# Patient Record
Sex: MALE | Race: OTHER | URBAN - METROPOLITAN AREA
[De-identification: names, ages, dates, MRNs, and addresses within clinical notes are randomized per-mention and may not be internally consistent; named-entity substitution may affect disease eponyms.]

---

## 2020-03-13 ENCOUNTER — EMERGENCY (EMERGENCY)
Facility: HOSPITAL | Age: 20
LOS: 1 days | Discharge: HOME | End: 2020-03-13
Attending: EMERGENCY MEDICINE
Payer: MEDICAID

## 2020-03-13 VITALS
SYSTOLIC BLOOD PRESSURE: 119 MMHG | RESPIRATION RATE: 18 BRPM | TEMPERATURE: 99 F | OXYGEN SATURATION: 96 % | WEIGHT: 190.04 LBS | DIASTOLIC BLOOD PRESSURE: 77 MMHG | HEART RATE: 103 BPM | HEIGHT: 70 IN

## 2020-03-13 PROCEDURE — 99283 EMERGENCY DEPT VISIT LOW MDM: CPT

## 2020-03-13 PROCEDURE — 99282 EMERGENCY DEPT VISIT SF MDM: CPT

## 2020-03-13 NOTE — ED PROVIDER NOTE - OBJECTIVE STATEMENT
20 y/o M patient that works at [employer] as a  with no significant PMHx and no significant PSHx presents to the ED with c/o runny nose, congestion, subjective fever today. Patient denies any back pain, known exposure of COVID-19 exposure to the positive tested patient, travel outside of the country, or any other complains.

## 2020-03-13 NOTE — ED PROVIDER NOTE - NSFOLLOWUPINSTRUCTIONS_ED_ALL_ED_FT
Take Tylenol/Ibuprofen as needed for pains/fevers.  Drink plenty of fluids.  Read and follow Covid discharge instructions/precautions as discussed.  Follow up with your doctor or in the Clinic as discussed within 2 days.  Return to the ER for any concerns.

## 2020-03-13 NOTE — ED PROVIDER NOTE - PATIENT PORTAL LINK FT
You can access the FollowMyHealth Patient Portal offered by [facility] by registering at the following website: [email or web address]. By joining [facility]’s FollowMyHealth portal, you will also be able to view your health information using other applications (apps) compatible with our system.

## 2020-03-13 NOTE — ED PROVIDER NOTE - NSFOLLOWUPCLINICS_GEN_ALL_ED_FT
[facility]  [facility]ty Office  [address] - [address]nd [place] [address]  Phone: [phone]  Fax: [phone]  Follow Up Time:

## 2020-03-13 NOTE — ED PROVIDER NOTE - CLINICAL SUMMARY MEDICAL DECISION MAKING FREE TEXT BOX
Likely URI vs. COVID. Discuss recommendation of self isolation, COVID d/c precautions given to the patient. Will d/c.

## 2022-04-06 ENCOUNTER — INPATIENT (INPATIENT)
Facility: HOSPITAL | Age: 22
LOS: 1 days | Discharge: ANOTHER HOSPITAL | DRG: 310 | End: 2022-04-08
Attending: INTERNAL MEDICINE | Admitting: INTERNAL MEDICINE
Payer: MEDICAID

## 2022-04-06 VITALS
RESPIRATION RATE: 16 BRPM | OXYGEN SATURATION: 99 % | TEMPERATURE: 99 F | DIASTOLIC BLOOD PRESSURE: 72 MMHG | WEIGHT: 200.62 LBS | HEIGHT: 70 IN | SYSTOLIC BLOOD PRESSURE: 108 MMHG | HEART RATE: 99 BPM

## 2022-04-06 DIAGNOSIS — R07.9 CHEST PAIN, UNSPECIFIED: ICD-10-CM

## 2022-04-06 LAB
ALBUMIN SERPL ELPH-MCNC: 4.1 G/DL — SIGNIFICANT CHANGE UP (ref 3.5–5)
ALP SERPL-CCNC: 61 U/L — SIGNIFICANT CHANGE UP (ref 40–120)
ALT FLD-CCNC: 43 U/L DA — SIGNIFICANT CHANGE UP (ref 10–60)
ANION GAP SERPL CALC-SCNC: 6 MMOL/L — SIGNIFICANT CHANGE UP (ref 5–17)
AST SERPL-CCNC: 16 U/L — SIGNIFICANT CHANGE UP (ref 10–40)
BASOPHILS # BLD AUTO: 0.03 K/UL — SIGNIFICANT CHANGE UP (ref 0–0.2)
BASOPHILS NFR BLD AUTO: 0.4 % — SIGNIFICANT CHANGE UP (ref 0–2)
BILIRUB SERPL-MCNC: 0.7 MG/DL — SIGNIFICANT CHANGE UP (ref 0.2–1.2)
BUN SERPL-MCNC: 12 MG/DL — SIGNIFICANT CHANGE UP (ref 7–18)
CALCIUM SERPL-MCNC: 9.1 MG/DL — SIGNIFICANT CHANGE UP (ref 8.4–10.5)
CHLORIDE SERPL-SCNC: 107 MMOL/L — SIGNIFICANT CHANGE UP (ref 96–108)
CK MB BLD-MCNC: <1.4 % — SIGNIFICANT CHANGE UP (ref 0–3.5)
CK MB CFR SERPL CALC: <1 NG/ML — SIGNIFICANT CHANGE UP (ref 0–3.6)
CK SERPL-CCNC: 74 U/L — SIGNIFICANT CHANGE UP (ref 35–232)
CO2 SERPL-SCNC: 26 MMOL/L — SIGNIFICANT CHANGE UP (ref 22–31)
CREAT SERPL-MCNC: 0.85 MG/DL — SIGNIFICANT CHANGE UP (ref 0.5–1.3)
D DIMER BLD IA.RAPID-MCNC: <150 NG/ML DDU — SIGNIFICANT CHANGE UP
EGFR: 125 ML/MIN/1.73M2 — SIGNIFICANT CHANGE UP
EOSINOPHIL # BLD AUTO: 0.04 K/UL — SIGNIFICANT CHANGE UP (ref 0–0.5)
EOSINOPHIL NFR BLD AUTO: 0.5 % — SIGNIFICANT CHANGE UP (ref 0–6)
GLUCOSE SERPL-MCNC: 106 MG/DL — HIGH (ref 70–99)
HCT VFR BLD CALC: 40.5 % — SIGNIFICANT CHANGE UP (ref 39–50)
HGB BLD-MCNC: 13.8 G/DL — SIGNIFICANT CHANGE UP (ref 13–17)
IMM GRANULOCYTES NFR BLD AUTO: 0.1 % — SIGNIFICANT CHANGE UP (ref 0–1.5)
LYMPHOCYTES # BLD AUTO: 1.15 K/UL — SIGNIFICANT CHANGE UP (ref 1–3.3)
LYMPHOCYTES # BLD AUTO: 14.6 % — SIGNIFICANT CHANGE UP (ref 13–44)
MCHC RBC-ENTMCNC: 30.1 PG — SIGNIFICANT CHANGE UP (ref 27–34)
MCHC RBC-ENTMCNC: 34.1 GM/DL — SIGNIFICANT CHANGE UP (ref 32–36)
MCV RBC AUTO: 88.2 FL — SIGNIFICANT CHANGE UP (ref 80–100)
MONOCYTES # BLD AUTO: 0.55 K/UL — SIGNIFICANT CHANGE UP (ref 0–0.9)
MONOCYTES NFR BLD AUTO: 7 % — SIGNIFICANT CHANGE UP (ref 2–14)
NEUTROPHILS # BLD AUTO: 6.07 K/UL — SIGNIFICANT CHANGE UP (ref 1.8–7.4)
NEUTROPHILS NFR BLD AUTO: 77.4 % — HIGH (ref 43–77)
NRBC # BLD: 0 /100 WBCS — SIGNIFICANT CHANGE UP (ref 0–0)
PCP SPEC-MCNC: SIGNIFICANT CHANGE UP
PLATELET # BLD AUTO: 205 K/UL — SIGNIFICANT CHANGE UP (ref 150–400)
POTASSIUM SERPL-MCNC: 3.9 MMOL/L — SIGNIFICANT CHANGE UP (ref 3.5–5.3)
POTASSIUM SERPL-SCNC: 3.9 MMOL/L — SIGNIFICANT CHANGE UP (ref 3.5–5.3)
PROT SERPL-MCNC: 8.3 G/DL — SIGNIFICANT CHANGE UP (ref 6–8.3)
RBC # BLD: 4.59 M/UL — SIGNIFICANT CHANGE UP (ref 4.2–5.8)
RBC # FLD: 13 % — SIGNIFICANT CHANGE UP (ref 10.3–14.5)
SARS-COV-2 RNA SPEC QL NAA+PROBE: SIGNIFICANT CHANGE UP
SODIUM SERPL-SCNC: 139 MMOL/L — SIGNIFICANT CHANGE UP (ref 135–145)
TROPONIN I, HIGH SENSITIVITY RESULT: 5.7 NG/L — SIGNIFICANT CHANGE UP
TROPONIN I, HIGH SENSITIVITY RESULT: 6.6 NG/L — SIGNIFICANT CHANGE UP
WBC # BLD: 7.85 K/UL — SIGNIFICANT CHANGE UP (ref 3.8–10.5)
WBC # FLD AUTO: 7.85 K/UL — SIGNIFICANT CHANGE UP (ref 3.8–10.5)

## 2022-04-06 PROCEDURE — 99285 EMERGENCY DEPT VISIT HI MDM: CPT

## 2022-04-06 PROCEDURE — 71045 X-RAY EXAM CHEST 1 VIEW: CPT | Mod: 26

## 2022-04-06 NOTE — H&P ADULT - PROBLEM SELECTOR PLAN 1
- p/w palpitation  - likely panic attack vs ACS vs WPW, LGL  - trop 5.7 > 6.6  - utox neg  - EKG normal rhythm w/ shorten MD  - c/w ASA, statin, ativan PRN  - telemonitoring  - trend trop  - f/u echo

## 2022-04-06 NOTE — ED PROVIDER NOTE - PROGRESS NOTE DETAILS
Incoming call from patient's wife stating she spoke to someone to fax records. I dont show any phone calls about needing records faxed. Communicated to Luz Marina that if they sign a release, we can send to release of information and records can be sent to where it needs to go. Luz Marina is going to stop by the clinic today to sign a release. She states she has power of  of patient.    Patient is resting comfortably, NAD. I spoke to Dr. Taylor, who reviewed EKGs. He recommended admission to tele. EKG #2 from 19:33 - SR at 76 with short AZ, unchanged from prior EKG. Patient currently asymptomatic. endorsed to Dr. Ambriz.

## 2022-04-06 NOTE — ED ADULT NURSE NOTE - OBJECTIVE STATEMENT
Patient AOx3, calm, cooperative, in no acute distress, reports left side burning chest pain 4/10 and palpitations that started today prior to arrival. No SOB or difficulty breathing.

## 2022-04-06 NOTE — H&P ADULT - PROBLEM SELECTOR PLAN 2
- noted to have shorten AR  - ddx: WPW vs  - telemonitoring    - cardio, Dr. Orantes, consulted - noted to have shorten ME  - ddx: WPW vs  - telemonitoring    - cardio, Dr. Taylor, onboard

## 2022-04-06 NOTE — ED PROVIDER NOTE - OBJECTIVE STATEMENT
Patient reports at 4pm, he had sudden onset of heart racing, 8/10 burning chest pain, dyspnea, nausea, diaphoresis. Lasted 1 hour. All sx gone now except chest pain, which has improved to 4/10. Not exertional. No fever, cough, ap, v/d. Patient reports he had similar sx in October 2021 after he took meth. Says he has not used drugs or drank alcohol since then.

## 2022-04-06 NOTE — H&P ADULT - ATTENDING COMMENTS
21y Male complaining of palpitations patient reports at 4pm, he had sudden onset of heart racing, 8/10 burning chest pain, dyspnea, nausea, diaphoresis. Lasted 1 hour. All sx gone now except chest pain, which has improved to 4/10. Not exertional. No fever, cough, ap, v/d. Patient reports he had similar sx in October 2021 after he took meth. Says he has not used drugs or drank alcohol since then.        assessment   --- chest pain,  r/o acs, palpitation r/o wpw, r/o afifb, h/o illicit drug use    plan  --  adm to tele, acs protocol, hold lopressor pending drug screen, aspirin, statin, cont preadmit home meds, gi and dvt prophylaxis  drug screen stat, cbc, bmp, mg, phos, lipid, tsh, ce q8 x3    echo    cardio cons 21yoM, h/o methamphetamine use disorder (last use Oct2021), presents with palpitation. He reports sudden chest palpitation around at 4:30pm, [place] He reports associated burning, left-sided chest pain that is 8-9/10, intermittent, and non-radiating. It was also associated with nausea SOB and HA. He states feeling of panic, crowdedness, anxiousness, heat flush, and perspiration. He abruptly stopped taking seroquel 2 wks ago due to unable to follow up with psychiatrist. He denies fever, chills, vomiting, abdominal pain, urinary or bowel movement changes. All sx gone now except chest pain, which has improved to 4/10. Not exertional. No fever, cough, ap, v/d. Patient reports he had similar sx in October 2021 after he took meth. Says he has not used drugs or drank alcohol since then.        assessment   --- chest pain,  r/o acs, palpitation r/o wpw, r/o afib, panic attack, h/o methamphetamine use disorder (last use Oct2021)    plan  --  adm to tele, acs protocol, hold lopressor pending drug screen, aspirin, statin, cont preadmit home meds, gi and dvt prophylaxis  drug screen stat, cbc, bmp, mg, phos, lipid, tsh, ce q8 x3    echo    cardio cons

## 2022-04-06 NOTE — H&P ADULT - ASSESSMENT
Patient is a 21yoM, h/o methamphetamine use disorder (last use Oct2021), presents with palpitation. Admitted for abnormal EKG

## 2022-04-06 NOTE — ED PROVIDER NOTE - CLINICAL SUMMARY MEDICAL DECISION MAKING FREE TEXT BOX
Patient with 1 hour of heart racing, chest pain, shortness of breath, nausea, diaphoresis. EKG with short FL. concern for WPW. WIll get labs and consult cardiology.

## 2022-04-06 NOTE — H&P ADULT - HISTORY OF PRESENT ILLNESS
Patient is a 21yoM, h/o methamphetamine use disorder (last use Oct2021), presents with palpitation. He reports sudden chest palpitation around at 4:30pm, [place]. He reports associated burning, left-sided chest pain that is 8-9/10, intermittent, and non-radiating. It was also associated with nausea SOB and HA. He states feeling of panic, crowdedness, anxiousness, heat flush, and perspiration. He abruptly stopped taking seroquel 2 wks ago due to unable to follow up with psychiatrist. He denies fever, chills, vomiting, abdominal pain, urinary or bowel movement changes.

## 2022-04-07 DIAGNOSIS — R94.31 ABNORMAL ELECTROCARDIOGRAM [ECG] [EKG]: ICD-10-CM

## 2022-04-07 DIAGNOSIS — R06.02 SHORTNESS OF BREATH: ICD-10-CM

## 2022-04-07 DIAGNOSIS — R00.2 PALPITATIONS: ICD-10-CM

## 2022-04-07 DIAGNOSIS — Z29.9 ENCOUNTER FOR PROPHYLACTIC MEASURES, UNSPECIFIED: ICD-10-CM

## 2022-04-07 DIAGNOSIS — F41.9 ANXIETY DISORDER, UNSPECIFIED: ICD-10-CM

## 2022-04-07 PROBLEM — Z78.9 OTHER SPECIFIED HEALTH STATUS: Chronic | Status: ACTIVE | Noted: 2020-03-16

## 2022-04-07 LAB
ALBUMIN SERPL ELPH-MCNC: 4.2 G/DL — SIGNIFICANT CHANGE UP (ref 3.5–5)
ALP SERPL-CCNC: 59 U/L — SIGNIFICANT CHANGE UP (ref 40–120)
ALT FLD-CCNC: 42 U/L DA — SIGNIFICANT CHANGE UP (ref 10–60)
ANION GAP SERPL CALC-SCNC: 9 MMOL/L — SIGNIFICANT CHANGE UP (ref 5–17)
AST SERPL-CCNC: 14 U/L — SIGNIFICANT CHANGE UP (ref 10–40)
BASOPHILS # BLD AUTO: 0.03 K/UL — SIGNIFICANT CHANGE UP (ref 0–0.2)
BASOPHILS NFR BLD AUTO: 0.4 % — SIGNIFICANT CHANGE UP (ref 0–2)
BILIRUB SERPL-MCNC: 0.8 MG/DL — SIGNIFICANT CHANGE UP (ref 0.2–1.2)
BUN SERPL-MCNC: 12 MG/DL — SIGNIFICANT CHANGE UP (ref 7–18)
CALCIUM SERPL-MCNC: 9.1 MG/DL — SIGNIFICANT CHANGE UP (ref 8.4–10.5)
CHLORIDE SERPL-SCNC: 107 MMOL/L — SIGNIFICANT CHANGE UP (ref 96–108)
CK MB BLD-MCNC: <1.4 % — SIGNIFICANT CHANGE UP (ref 0–3.5)
CK MB CFR SERPL CALC: <1 NG/ML — SIGNIFICANT CHANGE UP (ref 0–3.6)
CK SERPL-CCNC: 73 U/L — SIGNIFICANT CHANGE UP (ref 35–232)
CO2 SERPL-SCNC: 24 MMOL/L — SIGNIFICANT CHANGE UP (ref 22–31)
CREAT SERPL-MCNC: 0.76 MG/DL — SIGNIFICANT CHANGE UP (ref 0.5–1.3)
EGFR: 130 ML/MIN/1.73M2 — SIGNIFICANT CHANGE UP
EOSINOPHIL # BLD AUTO: 0.09 K/UL — SIGNIFICANT CHANGE UP (ref 0–0.5)
EOSINOPHIL NFR BLD AUTO: 1.3 % — SIGNIFICANT CHANGE UP (ref 0–6)
GLUCOSE SERPL-MCNC: 89 MG/DL — SIGNIFICANT CHANGE UP (ref 70–99)
HCT VFR BLD CALC: 40.9 % — SIGNIFICANT CHANGE UP (ref 39–50)
HGB BLD-MCNC: 13.9 G/DL — SIGNIFICANT CHANGE UP (ref 13–17)
IMM GRANULOCYTES NFR BLD AUTO: 0.1 % — SIGNIFICANT CHANGE UP (ref 0–1.5)
LYMPHOCYTES # BLD AUTO: 2.89 K/UL — SIGNIFICANT CHANGE UP (ref 1–3.3)
LYMPHOCYTES # BLD AUTO: 43.2 % — SIGNIFICANT CHANGE UP (ref 13–44)
MAGNESIUM SERPL-MCNC: 2 MG/DL — SIGNIFICANT CHANGE UP (ref 1.6–2.6)
MCHC RBC-ENTMCNC: 30 PG — SIGNIFICANT CHANGE UP (ref 27–34)
MCHC RBC-ENTMCNC: 34 GM/DL — SIGNIFICANT CHANGE UP (ref 32–36)
MCV RBC AUTO: 88.3 FL — SIGNIFICANT CHANGE UP (ref 80–100)
MONOCYTES # BLD AUTO: 0.56 K/UL — SIGNIFICANT CHANGE UP (ref 0–0.9)
MONOCYTES NFR BLD AUTO: 8.4 % — SIGNIFICANT CHANGE UP (ref 2–14)
NEUTROPHILS # BLD AUTO: 3.11 K/UL — SIGNIFICANT CHANGE UP (ref 1.8–7.4)
NEUTROPHILS NFR BLD AUTO: 46.6 % — SIGNIFICANT CHANGE UP (ref 43–77)
NRBC # BLD: 0 /100 WBCS — SIGNIFICANT CHANGE UP (ref 0–0)
PHOSPHATE SERPL-MCNC: 4.6 MG/DL — HIGH (ref 2.5–4.5)
PLATELET # BLD AUTO: 198 K/UL — SIGNIFICANT CHANGE UP (ref 150–400)
POTASSIUM SERPL-MCNC: 3.6 MMOL/L — SIGNIFICANT CHANGE UP (ref 3.5–5.3)
POTASSIUM SERPL-SCNC: 3.6 MMOL/L — SIGNIFICANT CHANGE UP (ref 3.5–5.3)
PROT SERPL-MCNC: 7.8 G/DL — SIGNIFICANT CHANGE UP (ref 6–8.3)
RBC # BLD: 4.63 M/UL — SIGNIFICANT CHANGE UP (ref 4.2–5.8)
RBC # FLD: 13.2 % — SIGNIFICANT CHANGE UP (ref 10.3–14.5)
SODIUM SERPL-SCNC: 140 MMOL/L — SIGNIFICANT CHANGE UP (ref 135–145)
TROPONIN I, HIGH SENSITIVITY RESULT: 3.6 NG/L — SIGNIFICANT CHANGE UP
TSH SERPL-MCNC: 2.3 UU/ML — SIGNIFICANT CHANGE UP (ref 0.34–4.82)
WBC # BLD: 6.69 K/UL — SIGNIFICANT CHANGE UP (ref 3.8–10.5)
WBC # FLD AUTO: 6.69 K/UL — SIGNIFICANT CHANGE UP (ref 3.8–10.5)

## 2022-04-07 PROCEDURE — 93306 TTE W/DOPPLER COMPLETE: CPT | Mod: 26

## 2022-04-07 PROCEDURE — 93010 ELECTROCARDIOGRAM REPORT: CPT

## 2022-04-07 RX ORDER — POTASSIUM CHLORIDE 20 MEQ
40 PACKET (EA) ORAL ONCE
Refills: 0 | Status: COMPLETED | OUTPATIENT
Start: 2022-04-07 | End: 2022-04-07

## 2022-04-07 RX ORDER — ENOXAPARIN SODIUM 100 MG/ML
40 INJECTION SUBCUTANEOUS EVERY 24 HOURS
Refills: 0 | Status: DISCONTINUED | OUTPATIENT
Start: 2022-04-07 | End: 2022-04-07

## 2022-04-07 RX ORDER — ASPIRIN/CALCIUM CARB/MAGNESIUM 324 MG
81 TABLET ORAL DAILY
Refills: 0 | Status: DISCONTINUED | OUTPATIENT
Start: 2022-04-07 | End: 2022-04-07

## 2022-04-07 RX ORDER — ATORVASTATIN CALCIUM 80 MG/1
40 TABLET, FILM COATED ORAL AT BEDTIME
Refills: 0 | Status: DISCONTINUED | OUTPATIENT
Start: 2022-04-07 | End: 2022-04-07

## 2022-04-07 RX ORDER — ACETAMINOPHEN 500 MG
650 TABLET ORAL ONCE
Refills: 0 | Status: COMPLETED | OUTPATIENT
Start: 2022-04-07 | End: 2022-04-07

## 2022-04-07 RX ADMIN — Medication 650 MILLIGRAM(S): at 02:44

## 2022-04-07 RX ADMIN — Medication 81 MILLIGRAM(S): at 00:37

## 2022-04-07 RX ADMIN — Medication 650 MILLIGRAM(S): at 01:54

## 2022-04-07 RX ADMIN — Medication 81 MILLIGRAM(S): at 11:45

## 2022-04-07 RX ADMIN — Medication 40 MILLIEQUIVALENT(S): at 11:45

## 2022-04-07 RX ADMIN — ATORVASTATIN CALCIUM 40 MILLIGRAM(S): 80 TABLET, FILM COATED ORAL at 00:38

## 2022-04-07 NOTE — DISCHARGE NOTE PROVIDER - HOSPITAL COURSE
21yoM, h/o methamphetamine use disorder (last use Oct2021), presents with palpitations. He reports sudden chest palpitation around at 4:30pm, [place]. He reports associated burning, left-sided chest pain that is 8-9/10, intermittent, and non-radiating. It was also associated with nausea SOB and HA. He states feeling of panic, crowdedness, anxiousness, heat flush, and perspiration. He abruptly stopped taking seroquel 2 wks ago due to unable to follow up with psychiatrist. He denies fever, chills, vomiting, abdominal pain, urinary or bowel movement changes. No family history of arrythmia or sudden cardiac death. Pt was admitted to telemetry unit for further workup. Troponin was negativex3, Utox negative, EKG showed normal rhythm w/ shorten IN, incomplete RBBB. Pt was started on aspirin statin and ativan PRN. Cardio Claudia and ALDAIR Gregory consulted  ALDAIR Gregory. Pt had episodes of SVT to 140s on tele. Stress-echo was normal. Patient to be transferred to [facility] for EP study under ALDAIR Gregory.

## 2022-04-07 NOTE — CONSULT NOTE ADULT - SUBJECTIVE AND OBJECTIVE BOX
C A R D I O L O G Y  *********************    DATE OF SERVICE: 04-07-22    HISTORY OF PRESENT ILLNESS: HPI:  Patient is a 21yoM, h/o methamphetamine use disorder (last use Oct2021), presents with palpitation. He reports sudden chest palpitation around at 4:30pm, [place] He reports associated burning, left-sided chest pain that is 8-9/10, intermittent, and non-radiating. It was also associated with nausea SOB and HA. He states feeling of panic, crowdedness, anxiousness, heat flush, and perspiration. He abruptly stopped taking seroquel 2 wks ago due to unable to follow up with psychiatrist. He denies fever, chills, vomiting, abdominal pain, urinary or bowel movement changes. (06 Apr 2022 21:45)      PAST MEDICAL & SURGICAL HISTORY:  No pertinent past medical history            MEDICATIONS:  MEDICATIONS  (STANDING):  aspirin  chewable 81 milliGRAM(s) Oral daily  atorvastatin 40 milliGRAM(s) Oral at bedtime  enoxaparin Injectable 40 milliGRAM(s) SubCutaneous every 24 hours  potassium chloride    Tablet ER 40 milliEquivalent(s) Oral once      Allergies    No Known Allergies    Intolerances        FAMILY HISTORY:    Non-contributary for premature coronary disease or sudden cardiac death    SOCIAL HISTORY:    [ ] Non-smoker  [ ] Smoker  [ ] Alcohol    FLU VACCINE THIS YEAR STARTS IN AUGUST:  [ ] Yes    [ ] No    IF OVER 65 HAVE YOU EVER HAD A PNA VACCINE:  [ ] Yes    [ ] No       [ ] N/A      REVIEW OF SYSTEMS:  [ ]chest pain  [  ]shortness of breath  [  ]palpitations  [  ]syncope  [ ]near syncope [ ]upper extremity weakness   [ ] lower extremity weakness  [  ]diplopia  [  ]altered mental status   [  ]fevers  [ ]chills [ ]nausea  [ ]vomitting  [  ]dysphagia    [ ]abdominal pain  [ ]melena  [ ]BRBPR    [  ]epistaxis  [  ]rash    [ ]lower extremity edema        [X] All others negative	  [ ] Unable to obtain      LABS:	 	    CARDIAC MARKERS:  CARDIAC MARKERS ( 07 Apr 2022 00:44 )  x     / x     / 73 U/L / x     / <1.0 ng/mL  CARDIAC MARKERS ( 06 Apr 2022 21:55 )  x     / x     / 74 U/L / x     / <1.0 ng/mL        Troponin I, High Sensitivity Result: 3.6 ng/L (04-07-22 @ 00:44)  Troponin I, High Sensitivity Result: 6.6 ng/L (04-06-22 @ 21:55)  Troponin I, High Sensitivity Result: 5.7 ng/L (04-06-22 @ 18:50)                            13.9   6.69  )-----------( 198      ( 07 Apr 2022 06:54 )             40.9     Hb Trend: 13.9<--, 13.8<--    04-07    140  |  107  |  12  ----------------------------<  89  3.6   |  24  |  0.76    Ca    9.1      07 Apr 2022 06:54  Phos  4.6     04-07  Mg     2.0     04-07    TPro  7.8  /  Alb  4.2  /  TBili  0.8  /  DBili  x   /  AST  14  /  ALT  42  /  AlkPhos  59  04-07    Creatinine Trend: 0.76<--, 0.85<--    Coags:      proBNP:   Lipid Profile:   HgA1c:   TSH: Thyroid Stimulating Hormone, Serum: 2.30 uU/mL (04-07 @ 06:54)          PHYSICAL EXAM:  T(C): 36.6 (04-07-22 @ 07:45), Max: 37.2 (04-06-22 @ 18:10)  HR: 73 (04-07-22 @ 07:45) (70 - 99)  BP: 104/70 (04-07-22 @ 07:45) (104/70 - 115/72)  RR: 17 (04-07-22 @ 07:45) (16 - 18)  SpO2: 95% (04-07-22 @ 07:45) (95% - 100%)  Wt(kg): --   BMI (kg/m2): 28.8 (04-06-22 @ 18:10)  I&O's Summary      Gen: Appears well in NAD  HEENT:  (-)icterus (-)pallor  CV: N S1 S2 1/6 KENNEY (+)2 Pulses B/l  Resp:  Clear to ausculatation B/L, normal effort  GI: (+) BS Soft, NT, ND  Lymph:  (-)Edema, (-)obvious lymphadenopathy  Skin: Warm to touch, Normal turgor  Psych: Appropriate mood and affect        TELEMETRY: 	      ECG:  	    RADIOLOGY:         CXR:     ASSESSMENT/PLAN: 	21y Male     C A R D I O L O G Y  *********************    DATE OF SERVICE: 04-07-22    HISTORY OF PRESENT ILLNESS: HPI:  Patient is a 21yoM, h/o methamphetamine use disorder (last use Oct2021), presents with palpitation. He reports sudden chest palpitation around at 4:30pm, [place]. He reports associated burning, left-sided chest pain that is 8-9/10, intermittent, and non-radiating. It was also associated with nausea SOB and HA. He felt as if he was going to pass out but did not loose consciousness He states feeling of panic, crowdedness, anxiousness, heat flush, and perspiration. He abruptly stopped taking seroquel 2 wks ago due to unable to follow up with psychiatrist. He denies fever, chills, vomiting, abdominal pain, urinary or bowel movement changes. (06 Apr 2022 21:45)      PAST MEDICAL & SURGICAL HISTORY:  No pertinent past medical history            MEDICATIONS:  MEDICATIONS  (STANDING):  aspirin  chewable 81 milliGRAM(s) Oral daily  atorvastatin 40 milliGRAM(s) Oral at bedtime  enoxaparin Injectable 40 milliGRAM(s) SubCutaneous every 24 hours  potassium chloride    Tablet ER 40 milliEquivalent(s) Oral once      Allergies    No Known Allergies    Intolerances        FAMILY HISTORY:    Non-contributary for premature coronary disease or sudden cardiac death    SOCIAL HISTORY:    [X ] Non-smoker  [ ] Smoker  [ ] Alcohol    FLU VACCINE THIS YEAR STARTS IN AUGUST:  [ ] Yes    [ ] No    IF OVER 65 HAVE YOU EVER HAD A PNA VACCINE:  [ ] Yes    [ ] No       [ ] N/A      REVIEW OF SYSTEMS:  [ ]chest pain  [  ]shortness of breath  [  ]palpitations  [  ]syncope  [ ]near syncope [ ]upper extremity weakness   [ ] lower extremity weakness  [  ]diplopia  [  ]altered mental status   [  ]fevers  [ ]chills [ ]nausea  [ ]vomitting  [  ]dysphagia    [ ]abdominal pain  [ ]melena  [ ]BRBPR    [  ]epistaxis  [  ]rash    [ ]lower extremity edema        [X] All others negative	  [ ] Unable to obtain      LABS:	 	    CARDIAC MARKERS:  CARDIAC MARKERS ( 07 Apr 2022 00:44 )  x     / x     / 73 U/L / x     / <1.0 ng/mL  CARDIAC MARKERS ( 06 Apr 2022 21:55 )  x     / x     / 74 U/L / x     / <1.0 ng/mL        Troponin I, High Sensitivity Result: 3.6 ng/L (04-07-22 @ 00:44)  Troponin I, High Sensitivity Result: 6.6 ng/L (04-06-22 @ 21:55)  Troponin I, High Sensitivity Result: 5.7 ng/L (04-06-22 @ 18:50)                            13.9   6.69  )-----------( 198      ( 07 Apr 2022 06:54 )             40.9     Hb Trend: 13.9<--, 13.8<--    04-07    140  |  107  |  12  ----------------------------<  89  3.6   |  24  |  0.76    Ca    9.1      07 Apr 2022 06:54  Phos  4.6     04-07  Mg     2.0     04-07    TPro  7.8  /  Alb  4.2  /  TBili  0.8  /  DBili  x   /  AST  14  /  ALT  42  /  AlkPhos  59  04-07    Creatinine Trend: 0.76<--, 0.85<--    Coags:      proBNP:   Lipid Profile:   HgA1c:   TSH: Thyroid Stimulating Hormone, Serum: 2.30 uU/mL (04-07 @ 06:54)          PHYSICAL EXAM:  T(C): 36.6 (04-07-22 @ 07:45), Max: 37.2 (04-06-22 @ 18:10)  HR: 73 (04-07-22 @ 07:45) (70 - 99)  BP: 104/70 (04-07-22 @ 07:45) (104/70 - 115/72)  RR: 17 (04-07-22 @ 07:45) (16 - 18)  SpO2: 95% (04-07-22 @ 07:45) (95% - 100%)  Wt(kg): --   BMI (kg/m2): 28.8 (04-06-22 @ 18:10)  I&O's Summary    HEENT:  (-)icterus (-)pallor  CV: N S1 S2 1/6 KENNEY (+)2 Pulses B/l  Resp:  Clear to ausculatation B/L, normal effort  GI: (+) BS Soft, NT, ND  Lymph:  (-)Edema, (-)obvious lymphadenopathy  Skin: Warm to touch, Normal turgor  Psych: Appropriate mood and affect        TELEMETRY: 	  Sinus, Sinus tach    ECG:  	Sinus Short MN, early R wave transition     RADIOLOGY:         CXR:   < from: Xray Chest 1 View- PORTABLE-Urgent (04.06.22 @ 18:49) >  Clear lungs.    < end of copied text >      ASSESSMENT/PLAN: 	21y Male admitted with palpitations, chest tightness, near syncope and an abnormal EKG concerning for pre-excitation.    - Echo  - Plan for stress echo today  - EP eval Dr. Gregory called   - TSH wnl    I once again thank you for allowing me to participate in the care of your patient.  If you have any questions or concerns please do not hesitate to contact me.    Beni Taylor MD, FACC  BEEPER [phone]

## 2022-04-07 NOTE — CONSULT NOTE ADULT - SUBJECTIVE AND OBJECTIVE BOX
EP Attending    HISTORY OF PRESENT ILLNESS: HPI:  Patient is a 21yoM, h/o methamphetamine use disorder (last use Oct2021), presents with palpitation. He reports sudden chest palpitation around at 4:30pm, while [place]. He reports associated burning, left-sided chest pain that is 8-9/10, intermittent, and non-radiating. It was also associated with nausea SOB and HA. He states feeling of panic, crowdedness, anxiousness, heat flush, and perspiration. He abruptly stopped taking seroquel 2 wks ago due to unable to follow up with psychiatrist. He denies fever, chills, vomiting, abdominal pain, urinary or bowel movement changes. (06 Apr 2022 21:45)    Patient states longstanding history of palpitations that are infrequent, but this episode was the most severe. Happened after a sudden burst of exertion [place] catch the subway.  Had intense chest pain, very rapid palpitations, felt nauseated and hungry for air.  He states he is sober from all alcohol and drugs.  Currently not working, living with family.  No history of fainting, or cardiac-arrest during exertion. A 10 pt ROS is otherwise negative.    PAST MEDICAL & SURGICAL HISTORY:  No pertinent past medical history    MEDICATIONS  (STANDING):  aspirin  chewable 81 milliGRAM(s) Oral daily  atorvastatin 40 milliGRAM(s) Oral at bedtime      Allergies    No Known Allergies    Intolerances    FAMILY HISTORY:    Non-contributary for premature coronary disease or sudden cardiac death    SOCIAL HISTORY:    [x ] Non-smoker  [ ] Smoker  [ ] Alcohol    PHYSICAL EXAM:  T(C): 36.5 (04-07-22 @ 11:02), Max: 37.2 (04-06-22 @ 18:10)  HR: 89 (04-07-22 @ 11:02) (70 - 99)  BP: 108/74 (04-07-22 @ 11:02) (104/70 - 115/72)  RR: 19 (04-07-22 @ 11:02) (16 - 19)  SpO2: 98% (04-07-22 @ 11:02) (95% - 100%)  Wt(kg): --    Interview conducted via video-conference.  Appearance: Normal appearing adult male in no acute distress  Psychiatry:  Mood & affect appropriate    TELEMETRY: NSR	    ECG: NSR with large R-waves in V1,V2, short TN, pre-excitation noted in leads III and V1.  May be consistent with a left-sided accessory pathway 	  Echo: Stress echo with normal biventricular function,after uneventul 10 METS exercise on treadmill.	  	  LABS:	 	                          13.9   6.69  )-----------( 198      ( 07 Apr 2022 06:54 )             40.9     04-07    140  |  107  |  12  ----------------------------<  89  3.6   |  24  |  0.76    Ca    9.1      07 Apr 2022 06:54  Phos  4.6     04-07  Mg     2.0     04-07    TPro  7.8  /  Alb  4.2  /  TBili  0.8  /  DBili  x   /  AST  14  /  ALT  42  /  AlkPhos  59  04-07  TSH: Thyroid Stimulating Hormone, Serum: 2.30 uU/mL (04-07 @ 06:54)      ASSESSMENT/PLAN: 	Mr Ford is a very pleasant 22yo male who presents with severe symptomatic palpitations.    His symptoms are infrequent but severe.  By the time he presented to the ED, was in sinus rhythm.  His stress-echo was normal.  He is abstinent from drugs and alcohol.  Based on the abnormal baseline EKG and his strong desire to avoid an ED presentation again, the patient was given options for pill-in-the-pocket beta blocker therapy, vs EP study and ablation for SVT.  I described a 30-40% rate of effectiveness for oral metoprolol to abort SVT, that may require some drug titration in the outpatient setting.  Ablation could be organized from the office setting in a few weeks after an appointment.  The side effect profile for PRN metoprolol is quite minimal, and is not expected to cause exercise intolerance or decrease in libido when used in this manner.  His backup plan would be to come to the nearest ED if palpitations are sustained.  An EP study would likely involve trans-septal puncture, and carries a 1% risk of vascular injury, or cardiac perforation which could require procedures to repair structures or drain blood collections.  The risk of MI, stroke, open-heart surgery or death is very low.  If he has a left-sided accessory pathway, ablation is not expected to cause the need for a cardiac pacemaker.  If we ablate a septal structure instead, that risk is ~3%.  I explained the plan of care in depth to the patient, and again using a [place]  for his mother's understanding while she was at bedside.  At this time his preference is to stay in-hospital to undergo EP study at [facility].    He is scheduled for mid-day (2nd case) tomorrow.   I've made him NPO at midnight and started the transfer process, and he will be accepted under my name at [facility].  No beta blockers overnight.  Does not need Lovenox tomorrow.  Will see him in-person before the procedure.  Anticipate he will be discharged next-day from [facility].      Ernesto Gregory M.D.  Cardiac Electrophysiology    office [phone]  pager [phone]

## 2022-04-07 NOTE — PROGRESS NOTE ADULT - SUBJECTIVE AND OBJECTIVE BOX
Patient is a 21y old  Male who presents with a chief complaint of CP (06 Apr 2022 21:45)    pt seen in icu [  ], reg med floor [   ], bed [  ], chair at bedside [   ], a+o x3 [  ], lethargic [  ],  nad [  ]    florez [  ], ngt [  ], peg [  ], et tube [  ], cent line [  ], picc line [  ]        Allergies    No Known Allergies        Vitals    T(F): 97.8 (04-07-22 @ 07:45), Max: 98.9 (04-06-22 @ 18:10)  HR: 73 (04-07-22 @ 07:45) (70 - 99)  BP: 104/70 (04-07-22 @ 07:45) (104/70 - 115/72)  RR: 17 (04-07-22 @ 07:45) (16 - 18)  SpO2: 95% (04-07-22 @ 07:45) (95% - 100%)  Wt(kg): --  CAPILLARY BLOOD GLUCOSE          Labs                          13.9   6.69  )-----------( 198      ( 07 Apr 2022 06:54 )             40.9       04-07    140  |  107  |  12  ----------------------------<  89  3.6   |  24  |  0.76    Ca    9.1      07 Apr 2022 06:54  Phos  4.6     04-07  Mg     2.0     04-07    TPro  7.8  /  Alb  4.2  /  TBili  0.8  /  DBili  x   /  AST  14  /  ALT  42  /  AlkPhos  59  04-07      CARDIAC MARKERS ( 07 Apr 2022 00:44 )  x     / x     / 73 U/L / x     / <1.0 ng/mL  CARDIAC MARKERS ( 06 Apr 2022 21:55 )  x     / x     / 74 U/L / x     / <1.0 ng/mL      Troponin I, High Sensitivity Result: 3.6 ng/L (04-07-22 @ 00:44)  Troponin I, High Sensitivity Result: 6.6 ng/L (04-06-22 @ 21:55)  Troponin I, High Sensitivity Result: 5.7 ng/L (04-06-22 @ 18:50)        Radiology Results      Meds    MEDICATIONS  (STANDING):  aspirin  chewable 81 milliGRAM(s) Oral daily  atorvastatin 40 milliGRAM(s) Oral at bedtime  enoxaparin Injectable 40 milliGRAM(s) SubCutaneous every 24 hours  potassium chloride    Tablet ER 40 milliEquivalent(s) Oral once      MEDICATIONS  (PRN):  LORazepam   Injectable 0.5 milliGRAM(s) IV Push daily PRN Anxiety      Physical Exam    Neuro :  no focal deficits  Respiratory: CTA B/L  CV: RRR, S1S2, no murmurs,   Abdominal: Soft, NT, ND +BS,  Extremities: No edema, + peripheral pulses    ASSESSMENT    Chest pain    No pertinent past medical history        PLAN     Patient is a 21y old  Male who presents with a chief complaint of CP (06 Apr 2022 21:45)    pt seen in tele [x  ], reg med floor [   ], bed [ x ], chair at bedside [   ], a+o x3 [ x ], lethargic [  ],  nad [ x ]    Allergies    No Known Allergies        Vitals    T(F): 97.8 (04-07-22 @ 07:45), Max: 98.9 (04-06-22 @ 18:10)  HR: 73 (04-07-22 @ 07:45) (70 - 99)  BP: 104/70 (04-07-22 @ 07:45) (104/70 - 115/72)  RR: 17 (04-07-22 @ 07:45) (16 - 18)  SpO2: 95% (04-07-22 @ 07:45) (95% - 100%)  Wt(kg): --  CAPILLARY BLOOD GLUCOSE          Labs                          13.9   6.69  )-----------( 198      ( 07 Apr 2022 06:54 )             40.9       04-07    140  |  107  |  12  ----------------------------<  89  3.6   |  24  |  0.76    Ca    9.1      07 Apr 2022 06:54  Phos  4.6     04-07  Mg     2.0     04-07    TPro  7.8  /  Alb  4.2  /  TBili  0.8  /  DBili  x   /  AST  14  /  ALT  42  /  AlkPhos  59  04-07            CARDIAC MARKERS ( 07 Apr 2022 00:44 )  x     / x     / 73 U/L / x     / <1.0 ng/mL  CARDIAC MARKERS ( 06 Apr 2022 21:55 )  x     / x     / 74 U/L / x     / <1.0 ng/mL      Troponin I, High Sensitivity Result: 3.6 ng/L (04-07-22 @ 00:44)  Troponin I, High Sensitivity Result: 6.6 ng/L (04-06-22 @ 21:55)  Troponin I, High Sensitivity Result: 5.7 ng/L (04-06-22 @ 18:50)        Radiology Results      Meds    MEDICATIONS  (STANDING):  aspirin  chewable 81 milliGRAM(s) Oral daily  atorvastatin 40 milliGRAM(s) Oral at bedtime  enoxaparin Injectable 40 milliGRAM(s) SubCutaneous every 24 hours  potassium chloride    Tablet ER 40 milliEquivalent(s) Oral once      MEDICATIONS  (PRN):  LORazepam   Injectable 0.5 milliGRAM(s) IV Push daily PRN Anxiety      Physical Exam    Neuro :  no focal deficits  Respiratory: CTA B/L  CV: RRR, S1S2, no murmurs,   Abdominal: Soft, NT, ND +BS,  Extremities: No edema, + peripheral pulses    ASSESSMENT    Chest pain   r/o acs,   palpitation r/o wpw,   r/o afib,   panic attack,   h/o methamphetamine use disorder (last use Oct2021)      PLAN    cont tele,   acs protocol, hold lopressor pending drug screen,   cont aspirin, statin,   cardio cons    trop x 3neg noted above   f/u echo  f/u stress test  ep cons. ativan prn  cont current meds

## 2022-04-07 NOTE — PROGRESS NOTE ADULT - SUBJECTIVE AND OBJECTIVE BOX
PGY-1 Progress Note discussed with attending    PAGER #: [[phone]] TILL 5:00 PM  PLEASE CONTACT ON CALL TEAM:  - On Call Team (Please refer to Britney) FROM 5:00 PM - 8:30PM  - Nightfloat Team FROM 8:30 -7:30 AM    CHIEF COMPLAINT & BRIEF HOSPITAL COURSE:  21yoM, h/o methamphetamine use disorder (last use Oct2021), presents with palpitations. He reports sudden chest palpitation around at 4:30pm, while [place] the [place]. He reports associated burning, left-sided chest pain that is 8-9/10, intermittent, and non-radiating. It was also associated with nausea SOB and HA. He states feeling of panic, crowdedness, anxiousness, heat flush, and perspiration. He abruptly stopped taking seroquel 2 wks ago due to unable to follow up with psychiatrist. He denies fever, chills, vomiting, abdominal pain, urinary or bowel movement changes.    INTERVAL HPI/OVERNIGHT EVENTS:   Patient seen and examined at bedside. No overnight events. Tele showing episodes of sinus tachy to 120s. No acute complaints this AM. Pending EP recs re possible WPW.    REVIEW OF SYSTEMS:  CONSTITUTIONAL: No fever, night sweats, weight loss, or fatigue  RESPIRATORY: No cough, wheezing, or hemoptysis; No shortness of breath  CARDIOVASCULAR: No chest pain, palpitations, dizziness, or leg swelling  GASTROINTESTINAL: No abdominal pain. No nausea, vomiting, or hematemesis; No diarrhea or constipation. No melena or hematochezia.  GENITOURINARY: No dysuria or hematuria, no urinary frequency  NEUROLOGICAL: No headaches, memory loss, loss of strength, numbness, or tremors  SKIN: No itching, burning, rashes, or lesions     Vital Signs Last 24 Hrs  T(C): 36.6 (07 Apr 2022 07:45), Max: 37.2 (06 Apr 2022 18:10)  T(F): 97.8 (07 Apr 2022 07:45), Max: 98.9 (06 Apr 2022 18:10)  HR: 73 (07 Apr 2022 07:45) (70 - 99)  BP: 104/70 (07 Apr 2022 07:45) (104/70 - 115/72)  BP(mean): --  RR: 17 (07 Apr 2022 07:45) (16 - 18)  SpO2: 95% (07 Apr 2022 07:45) (95% - 100%)    PHYSICAL EXAMINATION:  GENERAL: NAD, well built  HEAD:  Atraumatic, Normocephalic  EYES:  conjunctiva and sclera clear  NECK: Supple, No JVD, thyroid not examined   CHEST/LUNG: Clear to auscultation. No wheezing, rales, rubs or rhonchi  HEART: Regular rate and rhythm; Clear S1 and S2, No murmurs, rubs, or gallops  ABDOMEN: Soft, Nontender, Nondistended; Bowel sounds present  NERVOUS SYSTEM:  Alert & Oriented X3, CNII-XII intact, no focal neurological deficits   EXTREMITIES:  2+ Peripheral Pulses, No clubbing, cyanosis, or edema  SKIN: warm dry, no lesions noted                           13.9   6.69  )-----------( 198      ( 07 Apr 2022 06:54 )             40.9     04-07    140  |  107  |  12  ----------------------------<  89  3.6   |  24  |  0.76    Ca    9.1      07 Apr 2022 06:54  Phos  4.6     04-07  Mg     2.0     04-07    TPro  7.8  /  Alb  4.2  /  TBili  0.8  /  DBili  x   /  AST  14  /  ALT  42  /  AlkPhos  59  04-07    LIVER FUNCTIONS - ( 07 Apr 2022 06:54 )  Alb: 4.2 g/dL / Pro: 7.8 g/dL / ALK PHOS: 59 U/L / ALT: 42 U/L DA / AST: 14 U/L / GGT: x           CARDIAC MARKERS ( 07 Apr 2022 00:44 )  x     / x     / 73 U/L / x     / <1.0 ng/mL  CARDIAC MARKERS ( 06 Apr 2022 21:55 )  x     / x     / 74 U/L / x     / <1.0 ng/mL          CAPILLARY BLOOD GLUCOSE    MEDICATIONS  (STANDING):  aspirin  chewable 81 milliGRAM(s) Oral daily  atorvastatin 40 milliGRAM(s) Oral at bedtime  enoxaparin Injectable 40 milliGRAM(s) SubCutaneous every 24 hours  potassium chloride    Tablet ER 40 milliEquivalent(s) Oral once    MEDICATIONS  (PRN):  LORazepam   Injectable 0.5 milliGRAM(s) IV Push daily PRN Anxiety      RADIOLOGY & ADDITIONAL TESTS:        ACC: [id number] EXAM:  XR CHEST PORTABLE URGENT 1V                          PROCEDURE DATE:  04/06/2022          INTERPRETATION:  PROCEDURE: AP view of the chest.    CLINICAL INFORMATION: Chest pain.    COMPARISON: None.    FINDINGS:    Lungs: The lungs are clear.  Heart: The heart is normal in size.  Mediastinum: The mediastinum is within normal limits.    IMPRESSION:    Clear lungs.    --- End of Report ---    SIMON BANERJEE MD; Attending Radiologist  This document has been electronicallysigned. Apr 7 2022  7:20AM

## 2022-04-07 NOTE — PATIENT PROFILE ADULT - BRAND OF COVID-19 VACCINATION
[facility], [place]      Acceptance Note - [facility] [facility] Service       Date of Admission:  7/28/2020    Assessment & Plan   Caryl Martin is a 69 y.o. F with past medical history of HFpEF, CVA, aortic stenosis and mitral stenosis, HTN, alcoholic cirrhosis (c/b portal hypertensive gastropathy, ascites, encephalopathy, esophageal varices, and bone marrow aplasia), DM type II, CKD stage IV with chronic anemia, morbid obesity, hypothyroidism, wheelchair-bound d/t CIDP, and depression who is transferred from the MICU 8/9 after hemodynamic stabilization for GI bleed and hypotension. She continues to multisystem organ failure with worsening cognitive function and opted for comfort cares 8/16.    For full details of admission, please see interim summary dated 8/10/2020.    Updates today:   - COMFORT CARES after care conference today  - plan for discharge to facillity + hospice early this week  - will continue PO meds to prevent worsening (HE ppx, bumex for uremia, ppi for gi bleed)  - non-medically necessary PTA medications were discontinued  - transfer to gen floor  - comfort CLD today    # comfort  - Pain: PO oxycodone, lidocain patches, capsaicin, acetaminophen  - CLD comfort  - artificial tears, saliva, atroprine for secretions  - melatonin  - zofran PRN nausea  - zyprexa PRN delirium/anxiety    #Oliguric MARCELO on CKD stage IV  # metabolic acidosis  - repeat dose of bumex 2 mg today for uremia  - midodrine 20 mg TID  - sodium bicarb BID  - matthews for comfort    # Hepatic encephalopathy  # Acute on chronic decompensated EtOH cirrhosis with ascites  # Alcohol use disorder in remission  # Esophageal varices s/p banding x5  # Thrombocytopenia of chronic liver disease   >Ascites:  may need comfort volodymyr   >HE: Continue rifaxamin  >Diet:  CLD  >Varices:  PO pantoprazole 40 mg BID  - Palliative Care consulted, appreciate assistance in GOC    # C.Diff + ileus  # ileus  - simethicone prn  - vancomycin PO QID  (8/7- present)     #Intertrigo - Micatin powder PRN     Diet: CLD  Fluids: PO  Lines: n/a  DVT Prophylaxis: none  [place] Catheter: in place, indication: comfort  Code Status: DNR/DNI       Disposition Plan   Expected discharge: 1-2 days to facility + hospice for comfort cares.   Entered: Chava Azevedo MD 08/16/2020, 8:19 PM     The patient's care was discussed with the Attending Physician, Dr. Kim.    Chava Azevedo MD  [place] [id number] Service  Internal Medicine, PGY-1  [facility]  Pager: [phone]    ______________________________________________________________________    Interval History   NNR, no acute events overnight.     Discussed Caryl's worsening prognosis today with patient and family in setting of metabolic acidosis, new onset confusion, worsening uremia and ileus. We provided our recommendation for comfort cares in response to Caryl's goals of going home and seeing her family. The patient, even though confused, was still hesistant to pursue comfort cares and is still hoping for a medical miracle. We reassured her that a miracle is still possible but unlikely. Caryl is hopeful a  can come and pray with her while she is still in the hospital. We described that comfort cares is the best medical support we can give her through this time and that with hospice, we will continue to support her but our goals will shift from cure to comfort which Caryl states she appreciates because she was feeling much better yesterday. Feeling better also makes her nervous and depressed to pursue comfort cares because it gives her more hope. She states multiple times that she is depressed and does not want to die but does not think there is an alternative option at this time. Ultimately, Caryl and her family decide that transferring to a facility with hospice services is the best option. We contacted  services and a  is going to come talk with her. We contacted  and they are  going to reach out to her daughter in law to discuss transfer locations and options.     Data reviewed today: I reviewed all medications, new labs and imaging results over the last 24 hours. I personally reviewed new labs.    Physical Exam   Vital Signs: Temp: 97.5  F (36.4  C) Temp src: Oral BP: 112/55   Heart Rate: 90 Resp: 16 SpO2: 95 % O2 Device: None (Room air)    Weight: 197 lbs 1.46 oz  General: arousable but less alert today, answering questions appropriately but delayed response, appears to be comfortable lying in bed  HEENT: desquamation of [id number] plaque on sternum, erythematous base, no surrounding erythema  CV: RRR  Resp: Non-labored breathing conversationally  Abd: soft,significantly distended worse than yesterday(more significant on left side of abdomen), tympanic, hypoactive BS, tenderness to palpation  Skin: Dry flaking skin over feet. Chronic venous stasis changes to BLEs, pitting edema bilateral LE to knee  Neuro: appeared oriented to self and situation but confused and delayed to respond, asterixis at rest  Psych: flat affect, sad, not as responsive or insightful today    Data    Recent Labs   Lab 08/16/20  0358 08/15/20  2037 08/15/20  0622 08/15/20  0536  08/13/20  0528   WBC  --   --  4.4 Canceled, Test credited  --  6.5   HGB  --   --  7.2* Canceled, Test credited  --  7.6*   MCV  --   --  103* Canceled, Test credited  --  98   PLT 30*  --  22* Canceled, Test credited  --  38*   INR  --   --   --   --   --  1.58*    138  --  139   < > 133   POTASSIUM 4.1 4.0  --  5.5*   < > 4.5   CHLORIDE 110* 110*  --  111*   < > 107   CO2 17* 16*  --  15*   < > 17*   BUN 71* 69*  --  73*   < > 68*   CR 3.46* 3.31*  --  3.62*   < > 3.44*   ANIONGAP 10 12  --  12   < > 9   LUIS ALFREDO 8.4* 7.8*  --  8.2*   < > 8.1*   GLC 87 91  --  107*   < > 155*   ALBUMIN  --   --   --   --   --  2.8*   PROTTOTAL  --   --   --   --   --  5.6*   BILITOTAL  --   --   --   --   --  1.5*   ALKPHOS  --   --   --   --   --  100    ALT  --   --   --   --   --  10   AST  --   --   --   --   --  8    < > = values in this interval not displayed.      Date of vaccination is unknown/Pfizer dose 1 and 2

## 2022-04-07 NOTE — PROGRESS NOTE ADULT - PROBLEM SELECTOR PLAN 1
- p/w palpitation  - panic attack vs ACS vs WPW, LGL  - trops negx3  - utox neg  - EKG normal rhythm w/ shorten VA, tall R waves  c/w ASA, statin, ativan PRN  f/u stress-echo  Cardio Claudia  ALDAIR Gregory

## 2022-04-07 NOTE — CONSULT NOTE ADULT - SUBJECTIVE AND OBJECTIVE BOX
PULMONARY CONSULT NOTE      JYOTSNA SINGH  MRN-[id number]      History of Present Illness:  Reason for Admission:   History of Present Illness:   Patient is a 21yoM, h/o methamphetamine use disorder (last use Oct2021), presents with palpitation. He reports sudden chest palpitation around at 4:30pm, [place]. He reports associated burning, left-sided chest pain that is 8-9/10, intermittent, and non-radiating. It was also associated with nausea SOB and HA. He states feeling of panic, crowdedness, anxiousness, heat flush, and perspiration. He abruptly stopped taking seroquel 2 wks ago due to unable to follow up with psychiatrist. He denies fever, chills, vomiting, abdominal pain, urinary or bowel movement changes.      HISTORY OF PRESENT ILLNESS: As Above. Awake, alert, laying in bed in NAD. No smoking hx    MEDICATIONS  (STANDING):  aspirin  chewable 81 milliGRAM(s) Oral daily  atorvastatin 40 milliGRAM(s) Oral at bedtime  potassium chloride    Tablet ER 40 milliEquivalent(s) Oral once      MEDICATIONS  (PRN):  LORazepam   Injectable 0.5 milliGRAM(s) IV Push daily PRN Anxiety      Allergies    No Known Allergies    Intolerances        PAST MEDICAL & SURGICAL HISTORY:  No pertinent past medical history        FAMILY HISTORY:      SOCIAL HISTORY  Smoking History:     REVIEW OF SYSTEMS:    CONSTITUTIONAL:  No fevers, chills, sweats    HEENT:  Eyes:  No diplopia or blurred vision. ENT:  No earache, sore throat or runny nose.    CARDIOVASCULAR:  No pressure, squeezing, tightness, or heaviness about the chest; no palpitations.    RESPIRATORY:  Per HPI    GASTROINTESTINAL:  No abdominal pain, nausea, vomiting or diarrhea.    GENITOURINARY:  No dysuria, frequency or urgency.    NEUROLOGIC:  No paresthesias, fasciculations, seizures or weakness.    PSYCHIATRIC:  No disorder of thought or mood.    Vital Signs Last 24 Hrs  T(C): 36.6 (07 Apr 2022 07:45), Max: 37.2 (06 Apr 2022 18:10)  T(F): 97.8 (07 Apr 2022 07:45), Max: 98.9 (06 Apr 2022 18:10)  HR: 73 (07 Apr 2022 07:45) (70 - 99)  BP: 104/70 (07 Apr 2022 07:45) (104/70 - 115/72)  BP(mean): --  RR: 17 (07 Apr 2022 07:45) (16 - 18)  SpO2: 95% (07 Apr 2022 07:45) (95% - 100%)  I&O's Detail      PHYSICAL EXAMINATION:    GENERAL: The patient is a well-developed, well-nourished _____in no apparent distress.     HEENT: Head is normocephalic and atraumatic. Extraocular muscles are intact. Mucous membranes are moist.     NECK: Supple.     LUNGS: Clear to auscultation without wheezing, rales, or rhonchi. Respirations unlabored    HEART: Regular rate and rhythm without murmur.    ABDOMEN: Soft, nontender, and nondistended.  No hepatosplenomegaly is noted.    EXTREMITIES: Without any cyanosis, clubbing, rash, lesions or edema.    NEUROLOGIC: Grossly intact.      LABS:                        13.9   6.69  )-----------( 198      ( 07 Apr 2022 06:54 )             40.9     04-07    140  |  107  |  12  ----------------------------<  89  3.6   |  24  |  0.76    Ca    9.1      07 Apr 2022 06:54  Phos  4.6     04-07  Mg     2.0     04-07    TPro  7.8  /  Alb  4.2  /  TBili  0.8  /  DBili  x   /  AST  14  /  ALT  42  /  AlkPhos  59  04-07          CARDIAC MARKERS ( 07 Apr 2022 00:44 )  x     / x     / 73 U/L / x     / <1.0 ng/mL  CARDIAC MARKERS ( 06 Apr 2022 21:55 )  x     / x     / 74 U/L / x     / <1.0 ng/mL      D-Dimer Assay, Quantitative: <150 ng/mL DDU (04-06-22 @ 18:50)            MICROBIOLOGY:    RADIOLOGY & ADDITIONAL STUDIES:    CXR:  < from: Xray Chest 1 View- PORTABLE-Urgent (04.06.22 @ 18:49) >  IMPRESSION:    Clear lungs.    < end of copied text >    Ct scan chest;    ekg;    echo:

## 2022-04-07 NOTE — PROGRESS NOTE ADULT - PROBLEM SELECTOR PLAN 3
pt was prev on seroquel, stopped 2 weeks ago due to insurance changes  palpitations may be anxiety/panic related  c/w ativan PRN

## 2022-04-07 NOTE — DISCHARGE NOTE PROVIDER - NSDCCPCAREPLAN_GEN_ALL_CORE_FT
PRINCIPAL DISCHARGE DIAGNOSIS  Diagnosis: SVT (supraventricular tachycardia)  Assessment and Plan of Treatment: You came to the hospital complaining of palpitations. Cardiac enzymes were normal but an EKG showed abnormal findings. You were to the cardiac telemetry unit where your heart rate was monitored. You were foudn to have SVT on telemetry, meaning your heart beats very fast due possibly due to an accessory pathway in the conduction system of your heart. Cardiologist Dr. Taylor and Electrophysiologist Dr. Gregory were consulted. A stress echocardiogram was normal. You will be transferred to [facility] for an EP study and ablation. Please follow further recommendations from EP Dr. Ernesto Gregory for further management.

## 2022-04-07 NOTE — PROGRESS NOTE ADULT - PROBLEM SELECTOR PLAN 2
- noted to have shorten ND, Tall R waves   - ddx: WPW vs LGL  - no family hx of arrythmia, heart disease, or Sudden cardiac death  Cardio Claudia Gregory

## 2022-04-07 NOTE — PATIENT PROFILE ADULT - FALL HARM RISK - UNIVERSAL INTERVENTIONS
Bed in lowest position, wheels locked, appropriate side rails in place/Call bell, personal items and telephone in reach/Instruct patient to call for assistance before getting out of bed or chair/Non-slip footwear when patient is out of bed/[place] to call system/Physically safe environment - no spills, clutter or unnecessary equipment/Purposeful Proactive Rounding/Room/bathroom lighting operational, light cord in reach

## 2022-04-07 NOTE — PATIENT PROFILE ADULT - LEGAL HELP
Faxed request to PCP's office to send most recent labs.     ----- Message -----   From: Julia Nguyen CNP   Sent: 5/10/2021   2:35 PM CDT   To: , *     Can we obtain lab work that patient reports was recently performed through PCP.     
no

## 2022-04-07 NOTE — DISCHARGE NOTE PROVIDER - CARE PROVIDER_API CALL
Ernesto Gregory)  Cardiovascular Disease; Internal Medicine  [address] [address]  [place] [address]  Phone: [phone]  Fax: [phone]  Follow Up Time:

## 2022-04-08 ENCOUNTER — INPATIENT (INPATIENT)
Facility: HOSPITAL | Age: 22
LOS: 0 days | Discharge: HOME | DRG: 274 | End: 2022-04-09
Attending: EMERGENCY MEDICINE | Admitting: EMERGENCY MEDICINE
Payer: MEDICAID

## 2022-04-08 VITALS
DIASTOLIC BLOOD PRESSURE: 66 MMHG | SYSTOLIC BLOOD PRESSURE: 100 MMHG | HEART RATE: 72 BPM | TEMPERATURE: 97 F | RESPIRATION RATE: 18 BRPM | OXYGEN SATURATION: 95 %

## 2022-04-08 VITALS
HEART RATE: 72 BPM | WEIGHT: 199.96 LBS | SYSTOLIC BLOOD PRESSURE: 106 MMHG | HEIGHT: 70 IN | DIASTOLIC BLOOD PRESSURE: 64 MMHG | OXYGEN SATURATION: 100 % | RESPIRATION RATE: 16 BRPM | TEMPERATURE: 98 F

## 2022-04-08 DIAGNOSIS — I47.1 SUPRAVENTRICULAR TACHYCARDIA: ICD-10-CM

## 2022-04-08 LAB
ANION GAP SERPL CALC-SCNC: 5 MMOL/L — SIGNIFICANT CHANGE UP (ref 5–17)
APTT BLD: 37.8 SEC — HIGH (ref 27.5–35.5)
BLD GP AB SCN SERPL QL: NEGATIVE — SIGNIFICANT CHANGE UP
BUN SERPL-MCNC: 11 MG/DL — SIGNIFICANT CHANGE UP (ref 7–18)
CALCIUM SERPL-MCNC: 9.2 MG/DL — SIGNIFICANT CHANGE UP (ref 8.4–10.5)
CHLORIDE SERPL-SCNC: 108 MMOL/L — SIGNIFICANT CHANGE UP (ref 96–108)
CO2 SERPL-SCNC: 26 MMOL/L — SIGNIFICANT CHANGE UP (ref 22–31)
CREAT SERPL-MCNC: 0.89 MG/DL — SIGNIFICANT CHANGE UP (ref 0.5–1.3)
EGFR: 123 ML/MIN/1.73M2 — SIGNIFICANT CHANGE UP
GLUCOSE SERPL-MCNC: 93 MG/DL — SIGNIFICANT CHANGE UP (ref 70–99)
INR BLD: 1.11 RATIO — SIGNIFICANT CHANGE UP (ref 0.88–1.16)
MAGNESIUM SERPL-MCNC: 2.1 MG/DL — SIGNIFICANT CHANGE UP (ref 1.6–2.6)
PHOSPHATE SERPL-MCNC: 5 MG/DL — HIGH (ref 2.5–4.5)
POTASSIUM SERPL-MCNC: 4 MMOL/L — SIGNIFICANT CHANGE UP (ref 3.5–5.3)
POTASSIUM SERPL-SCNC: 4 MMOL/L — SIGNIFICANT CHANGE UP (ref 3.5–5.3)
PROTHROM AB SERPL-ACNC: 13.2 SEC — SIGNIFICANT CHANGE UP (ref 10.5–13.4)
RH IG SCN BLD-IMP: POSITIVE — SIGNIFICANT CHANGE UP
RH IG SCN BLD-IMP: POSITIVE — SIGNIFICANT CHANGE UP
SODIUM SERPL-SCNC: 139 MMOL/L — SIGNIFICANT CHANGE UP (ref 135–145)

## 2022-04-08 PROCEDURE — 93010 ELECTROCARDIOGRAM REPORT: CPT

## 2022-04-08 PROCEDURE — 82553 CREATINE MB FRACTION: CPT

## 2022-04-08 PROCEDURE — 85379 FIBRIN DEGRADATION QUANT: CPT

## 2022-04-08 PROCEDURE — 87635 SARS-COV-2 COVID-19 AMP PRB: CPT

## 2022-04-08 PROCEDURE — 99285 EMERGENCY DEPT VISIT HI MDM: CPT

## 2022-04-08 PROCEDURE — 84443 ASSAY THYROID STIM HORMONE: CPT

## 2022-04-08 PROCEDURE — 93306 TTE W/DOPPLER COMPLETE: CPT

## 2022-04-08 PROCEDURE — 93351 STRESS TTE COMPLETE: CPT

## 2022-04-08 PROCEDURE — 80048 BASIC METABOLIC PNL TOTAL CA: CPT

## 2022-04-08 PROCEDURE — 83735 ASSAY OF MAGNESIUM: CPT

## 2022-04-08 PROCEDURE — 93010 ELECTROCARDIOGRAM REPORT: CPT | Mod: 77

## 2022-04-08 PROCEDURE — 84484 ASSAY OF TROPONIN QUANT: CPT

## 2022-04-08 PROCEDURE — 82550 ASSAY OF CK (CPK): CPT

## 2022-04-08 PROCEDURE — 85025 COMPLETE CBC W/AUTO DIFF WBC: CPT

## 2022-04-08 PROCEDURE — 84100 ASSAY OF PHOSPHORUS: CPT

## 2022-04-08 PROCEDURE — 36415 COLL VENOUS BLD VENIPUNCTURE: CPT

## 2022-04-08 PROCEDURE — 93325 DOPPLER ECHO COLOR FLOW MAPG: CPT

## 2022-04-08 PROCEDURE — 85730 THROMBOPLASTIN TIME PARTIAL: CPT

## 2022-04-08 PROCEDURE — 93005 ELECTROCARDIOGRAM TRACING: CPT

## 2022-04-08 PROCEDURE — 71045 X-RAY EXAM CHEST 1 VIEW: CPT

## 2022-04-08 PROCEDURE — 80053 COMPREHEN METABOLIC PANEL: CPT

## 2022-04-08 PROCEDURE — 93320 DOPPLER ECHO COMPLETE: CPT

## 2022-04-08 PROCEDURE — 80307 DRUG TEST PRSMV CHEM ANLYZR: CPT

## 2022-04-08 PROCEDURE — 85610 PROTHROMBIN TIME: CPT

## 2022-04-08 RX ORDER — SODIUM CHLORIDE 9 MG/ML
400 INJECTION INTRAMUSCULAR; INTRAVENOUS; SUBCUTANEOUS
Refills: 0 | Status: COMPLETED | OUTPATIENT
Start: 2022-04-08 | End: 2022-04-08

## 2022-04-08 RX ORDER — LANOLIN ALCOHOL/MO/W.PET/CERES
3 CREAM (GRAM) TOPICAL AT BEDTIME
Refills: 0 | Status: DISCONTINUED | OUTPATIENT
Start: 2022-04-08 | End: 2022-04-09

## 2022-04-08 RX ORDER — ACETAMINOPHEN 500 MG
650 TABLET ORAL ONCE
Refills: 0 | Status: COMPLETED | OUTPATIENT
Start: 2022-04-08 | End: 2022-04-08

## 2022-04-08 RX ORDER — METOPROLOL TARTRATE 50 MG
1 TABLET ORAL
Qty: 120 | Refills: 0
Start: 2022-04-08 | End: 2022-05-07

## 2022-04-08 RX ORDER — METOPROLOL TARTRATE 50 MG
25 TABLET ORAL EVERY 6 HOURS
Refills: 0 | Status: DISCONTINUED | OUTPATIENT
Start: 2022-04-08 | End: 2022-04-09

## 2022-04-08 RX ORDER — QUETIAPINE FUMARATE 200 MG/1
1 TABLET, FILM COATED ORAL
Qty: 0 | Refills: 0 | DISCHARGE

## 2022-04-08 RX ADMIN — Medication 650 MILLIGRAM(S): at 23:16

## 2022-04-08 RX ADMIN — SODIUM CHLORIDE 75 MILLILITER(S): 9 INJECTION INTRAMUSCULAR; INTRAVENOUS; SUBCUTANEOUS at 17:53

## 2022-04-08 RX ADMIN — Medication 3 MILLIGRAM(S): at 23:15

## 2022-04-08 NOTE — H&P CARDIOLOGY - HISTORY OF PRESENT ILLNESS
21yoM, He states feeling of panic, crowdedness, anxiousness, heat flush, and perspiration. He abruptly stopped taking seroquel 2 wks ago due to unable to follow up with psychiatrist.    21y Male admitted with palpitations, chest tightness, near syncope and an abnormal EKG concerning for pre-excitation.  - Plan for stress echo today  - EP eval Dr. Gregory called   - TSH wnl    ·  Plan: - p/w palpitation  - panic attack vs ACS vs WPW, LGL  - trops negx3  - utox neg  - EKG normal rhythm w/ shorten NC, tall R waves  c/w ASA, statin, ativan PRN  f/u stress-echo  Cardio [facility] Gregory.      seen by pulm, PFTs as outpatient     Mr Ford is a very pleasant 22yo male with significant PMHx methamphetamine use disorder (last use Oct2021)who presents with severe symptomatic palpitations. He reports sudden chest palpitations while walking [place] the stair [place] the train station. He reports associated burning, left-sided chest pain that is 8-9/10, intermittent, and non-radiating. It was also associated with nausea SOB and HA. Admitted to     His symptoms are infrequent but severe.  By the time he presented to the ED, was in sinus rhythm.  His stress-echo was normal.  He is abstinent from drugs and alcohol.  Based on the abnormal baseline EKG and his strong desire to avoid an ED presentation again, the patient was given options for pill-in-the-pocket beta blocker therapy, vs EP study and ablation for SVT.   Mr Ford is 20yo male (denies cardiac implanted device) with significant PMHx methamphetamine use and marijuana use disorder (last use Oct2021) who presented to [facility] with severe symptomatic palpitations. He reports sudden chest palpitations while walking [place] the stair [place] the train station. He reports associated burning, left-sided chest pain that is 8-9/10, intermittent, and non-radiating. It was also associated with nausea SOB and HA.  By the time he presented to the ED, was in sinus rhythm,  stress-echo was normal. He abruptly stopped taking seroquel 2 wks ago due to unable to follow up with psychiatrist.He has been abstinent from drugs and alcohol since 10/21. Seen by Pulm, recommend PFT as outpatient. [facility] WNL. Seen and evaluated by Dr. Gregory and transferred to [facility] for EP study and ablation for SVT.    Patient currently denies chest pain, palpitations.   Cards: Claudia    Mr Ford is 20yo male (denies cardiac implanted device) with significant PMHx methamphetamine use and marijuana use disorder (last use Oct2021) and anxiety who presented to [facility] on 4/6/22 with severe symptomatic palpitations. He reported sudden chest palpitations while walking up the stair at the train station a/w burning, left-sided chest pain that is 8-9/10, intermittent, and non-radiating,  nausea, SOB and HA.  By the time he presented to the ED, was in sinus rhythm,  stress-echo was normal. He abruptly stopped taking seroquel 2 wks ago due to inablility to follow up with psychiatrist. He has been abstinent from drugs and alcohol since 10/21. Seen by Pulm, recommend PFT as outpatient. TSH WNL. ECG at  with NSR with large R-waves in V1,V2, short IL, pre-excitation noted in leads III and V1, may be consistent with a left-sided accessory pathway.  Seen and evaluated by Dr. Gregory and transferred to [facility] for EP study and ablation for SVT.    Patient currently denies chest pain, palpitations.   Cards: Claudia

## 2022-04-08 NOTE — ASU DISCHARGE PLAN (ADULT/PEDIATRIC) - A. DRIVE A CAR, OPERATE POWER TOOLS OR MACHINERY
GENERAL SURGERY and ENDOSCOPY  CONSULT NOTE  8/23/2020    Physician Consulted: Dr. Bronwyn Darby  Reason for Consult: Gallstone pancreatitis  Referring Physician: Dr. Roly Orantes    NORTH Garibay is a 75 y.o. male with PMH of CHF with LifeVest on aspirin, Hepatitis C, HTN, known cholelithiasis presents with abdominal pain. He states the pain began after working outside last night. He describes the pain as cramping in nature at rest but stabbing with movement and walking. He reports nausea with dinner last night and was unable to eat much due to the pain. He denies any vomiting. No fevers, chills, diarrhea or constipation. He feels like he is bloated and does not endorse an appetite. In the ED, his labs were significant for WBC 12.4, lipase 1,117, T. Bilirubin 1.4,  and . CT abd/pelvis showed acute pancreatitis and RUQ US showed cholelithiasis and CBD measuring 8.3mm. Last EF 40%.     Pt states he experienced this same pain last year. He was admitted in March of 2019 with acute gallstone pancreatitis. At the time, he also had sepsis due to infected AICD so he did not undergo cholecystectomy during that admission and was told to follow up as outpatient for procedure. He states that some personal issues got in the way of scheduling his procedure and then when he was finally scheduled for it, the pandemic hit and it has been delayed. Endoscopy is consulted for possible ERCP with papillotomy due to high cardiac risk of patient for surgical intervention.        Past Medical History:   Diagnosis Date    Anxiety     Chronic back pain     s/p trauma    Combined systolic and diastolic heart failure (HCC)     Erectile dysfunction     Headache(784.0)     Hepatitis C     Heroin abuse ([facility] [address]     Heroin use 1/11/2017    HFrEF (heart failure with reduced ejection fraction) ([facility] [address] 11/17/2017 9/28/17- echo- LVEF 32%, stage I DD, LA mildly enlarged, mild MR, mild AR    Hyperlipidemia     Hypertension     spray by Nasal route daily 8/28/19  Yes Sybil Briceno MD   famotidine (PEPCID) 20 MG tablet Take 1 tablet by mouth daily as needed  5/14/19  Yes Historical Provider, MD   docusate (COLACE, DULCOLAX) 100 MG CAPS Take 100 mg by mouth daily 6/13/19  Yes Arik Jeffery MD   mometasone-formoterol [facility]) 200-5 MCG/ACT inhaler Inhale 2 puffs into the lungs 2 times daily Rinse mouth after using.  5/30/19  Yes Jacoby Barragan MD   melatonin ER 1 MG TBCR tablet Take 1 tablet by mouth nightly as needed (insomnia) 4/3/19  Yes Deepak Winston MD   aspirin 81 MG chewable tablet Take 1 tablet by mouth daily 2/5/19  Yes Arik Jeffery MD   albuterol sulfate HFA (VENTOLIN HFA) 108 (90 Base) MCG/ACT inhaler Inhale 2 puffs into the lungs every 6 hours as needed for Wheezing or Shortness of Breath 2/5/19  Yes Arik Jeffery MD   Multiple Vitamins-Iron (QC DAILY MULTIVITAMINS/IRON) TABS 1 tab po daily 2/5/19  Yes Arik Jeffery MD   Blood Pressure Monitoring (SELF-TAKING BLOOD PRESSURE) MISC 1 each by Does not apply route daily 2/6/18   Arik Jeffery MD       No Known Allergies    Family History   Problem Relation Age of Onset    Breast Cancer Mother     Lung Cancer Mother     Heart Disease Father     Cancer Father 58    Depression Brother        Social History     Tobacco Use    Smoking status: Current Some Day Smoker     Packs/day: 0.75     Years: 8.00     Pack years: 6.00     Types: Cigarettes     Last attempt to quit: 01/2017     Years since quitting: 3.6    Smokeless tobacco: Never Used   Substance Use Topics    Alcohol use: Yes     Frequency: Monthly or less     Drinks per session: 1 or 2     Binge frequency: Never     Comment: rare wine/beer    Drug use: Not Currently     Types: IV     Comment: heroin, last used 3/19/17         Review of Systems   General ROS: negative  Hematological and Lymphatic ROS: negative  Respiratory ROS: negative  Cardiovascular ROS: negative  Gastrointestinal ROS: positive for - abdominal Gallstones. No gross gallbladder wall thickening. Pancreas:  See intraperitoneal space section. Spleen: Unremarkable. Adrenals: Normal Kidneys and ureters: Unremarkable. Stomach and bowel: Segments of descending and sigmoid colon are decompressed limiting evaluation of wall thickness. No pericolonic inflammatory changes. No diverticulitis. No bowel obstruction. Edema and a small amount of free fluid associated with the pancreas adjacent to the duodenum. Appendix: No evidence of appendicitis. Intraperitoneal space: Extensive peripancreatic edema/inflammatory like density extending into the mesentery with the small amount of free fluid tracking posteriorly. No free intraperitoneal air. Vasculature: Mild calcified plaque in the wall of the abdominal aorta. No aneurysm. Lymph nodes: No enlarged lymph nodes. Bladder: Unremarkable. Reproductive: Prostate gland is enlarged approximately 4.5 cm wide. Bones/joints: Degenerative changes in the spine. No acute fracture. No acute subluxation. L2-L3 and L3-L4 disc bulges with endplate spurring mildly narrowing spinal canal. Degenerative gas within the narrowed L4-L5 and L5-S1 disc spaces. L4-L5 disc bulge or possibly a disc protrusion contributing to mild-moderate spinal stenosis. L5-S1 peripherally calcified disc bulge mildly narrowing spinal canal. Multilevel bilateral lumbar foraminal narrowing. Soft tissues: Small bilateral fat containing inguinal hernias. 1. Acute pancreatitis. 2. Gallstones. 3. Enlarged prostate gland. 4. Degenerative changes and lumbar disc disease as above. Consider lumbar spine MRI to further evaluate as clinically warranted.  This report has been electronically signed by Severiano Bruin MD.    Us Abdomen Limited    Result Date: 2020  Patient MRN:  [id number] : 1956 Age: 75 years Gender: Male Order Date:  2020 9:52 AM EXAM: US ABDOMEN LIMITED NUMBER OF IMAGES:  78 INDICATION:  pancreatitis gallstone pancreatitis gallstone COMPARISON: cholelithiasis and CBD measuring 8.3mm. Last EF 40%.     Pt states he experienced this same pain last year. He was admitted in March of 2019 with acute gallstone pancreatitis. At the time, he also had sepsis due to infected AICD so he did not undergo cholecystectomy during that admission and was told to follow up as outpatient for procedure.  He states that some personal issues got in the way of scheduling his procedure and then when he was finally scheduled for it, the pandemic hit and it has been delayed.      Endoscopy is consulted for possible ERCP with papillotomy due to high cardiac risk of patient for surgical intervention.          Patient Active Problem List   Diagnosis    Erectile dysfunction    Hearing difficulty    Essential hypertension    Chronic systolic (congestive) heart failure (HCC)    Iron deficiency anemia    Gynecomastia, male    Left ventricular hypertrophy    Heroin use    NICM (nonischemic cardiomyopathy) ([address]    Mitral valve insufficiency    Asymptomatic PVCs    CKD (chronic kidney disease), stage II    Prediabetes    Insomnia    Tobacco abuse    Syncope    Hepatitis C    Calculus of gallbladder without cholecystitis without obstruction    Mild persistent asthma without complication    Endocarditis due to methicillin susceptible Staphylococcus aureus (MSSA)    Chronic obstructive pulmonary disease ([address]    Pancreatitis, unspecified pancreatitis type    Abdominal pain       No Known Allergies    Past Medical History:   Diagnosis Date    Anxiety     Chronic back pain     s/p trauma    Combined systolic and diastolic heart failure ([address]     Erectile dysfunction     Headache(784.0)     Hepatitis C     Heroin abuse ([address]     Heroin use 1/11/2017    HFrEF (heart failure with reduced ejection fraction) ([address] 11/17/2017 9/28/17- echo- LVEF 32%, stage I DD, LA mildly enlarged, mild MR, mild AR    Hyperlipidemia     Hypertension     ICD (implantable cardioverter-defibrillator), single, in situ 11/16/2017    IV drug user     heroin    Moderate mitral regurgitation     Nonischemic cardiomyopathy [facility])        Past Surgical History:   Procedure Laterality Date    CARDIAC CATHETERIZATION Left 4/2/2019    CARDIAC LASER LEAD EXTRACTION performed by Bessie Bess MD at [address]  11/16/2017    SGL CHAMBER ICD   ([employer])    DR. Soham Lau     COLONOSCOPY  07/24/2017    EMBOLECTOMY N/A 4/2/2019    ANGIOVAC REMOVAL OF VEGETATION performed by Bessie Bess MD at [facility] [facility]      reamp, left 4th digit    HERNIA REPAIR      bilateral in high school       Social History     Socioeconomic History    Marital status:      Spouse name: Not on file    Number of children: Not on file    Years of education: Not on file    Highest education level: Not on file   Occupational History    Occupation: laboror   Social Needs    Financial resource strain: Not on file    Food insecurity     Worry: Not on file     Inability: Not on file   [employer] needs     Medical: Not on file     Non-medical: Not on file   Tobacco Use    Smoking status: Current Some Day Smoker     Packs/day: 0.75     Years: 8.00     Pack years: 6.00     Types: Cigarettes     Last attempt to quit: 01/2017     Years since quitting: 3.6    Smokeless tobacco: Never Used   Substance and Sexual Activity    Alcohol use: Yes     Frequency: Monthly or less     Drinks per session: 1 or 2     Binge frequency: Never     Comment: rare wine/beer    Drug use: Not Currently     Types: IV     Comment: heroin, last used 3/19/17    Sexual activity: Not on file   Lifestyle    Physical activity     Days per week: Not on file     Minutes per session: Not on file    Stress: Not on file   Relationships    Social connections     Talks on phone: Not on file     Gets together: Not on file     Attends Zoroastrian service: Not on file     Active member of club or organization: Not on file     Attends meetings of clubs or organizations: Not on file     Relationship status: Not on file    Intimate partner violence     Fear of current or ex partner: Not on file     Emotionally abused: Not on file     Physically abused: Not on file     Forced sexual activity: Not on file   Other Topics Concern    Not on file   Social History Narrative    Drinks 3 cups of coffee daily. The patient has a family history that is negative for severe cardiovascular or respiratory issues, negative for reaction to anesthesia. Recent Labs     08/23/20 0257 08/24/20  0540   WBC 12.4* 14.8*   HGB 13.2 13.1   HCT 40.8 39.6   MCV 88.5 86.7    289       Recent Labs     08/23/20 0257 08/24/20  0540    136   K 4.6 3.6   CO2 21* 21*   BUN 28* 16   CREATININE 1.1 0.9       Recent Labs     08/23/20 0257 08/24/20  0540   PROT 7.4 6.7   LIPASE 1,117* 168*         Intake/Output Summary (Last 24 hours) at 8/24/2020 0002  Last data filed at 8/24/2020 3851  Gross per 24 hour   Intake 2288 ml   Output 400 ml   Net 1888 ml       I have reviewed relevant labs from this admission and interpretation is included in my assessment and plan      Review of Systems  A complete 10 system review was performed and are otherwise negative unless mentioned in the above HPI. Specific negatives are listed below but may not include all those reviewed.     General ROS: negative obtundation, AMS  ENT ROS: negative rhinorrhea, epistaxis  Allergy and Immunology ROS: negative itchy/watery eyes or nasal congestion  Hematological and Lymphatic ROS: negative spontaneous bleeding or bruising  Endocrine ROS: negative  lethargy, mood swings, palpitations or polydipsia/polyuria  Respiratory ROS: negative sputum changes, stridor, tachypnea or wheezing  Cardiovascular ROS: negative for - loss of consciousness, murmur or orthopnea  Gastrointestinal ROS: negative for - hematochezia or hematemesis  Genito-Urinary ROS: negative for -  genital discharge or hematuria  Musculoskeletal ROS: negative for - focal weakness, gangrene  Psych/Neuro ROS: negative for - visual or auditory hallucinations, suicidal ideation      Physical exam:   BP (!) 144/82   Pulse 99   Temp 98.9 °F (37.2 °C) (Temporal)   Resp 18   Ht 5' 8\" (1.727 m)   Wt 170 lb (77.1 kg)   SpO2 96%   BMI 25.85 kg/m²   General appearance:  NAD, appears stated age  Head: NCAT, PERRLA, EOMI, red conjunctiva  Neck: supple, no masses, trachea midline  Lungs: Equal chest rise bilateral, no retractions, no wheezing  Heart: Reg rate  Abdomen: soft, Mild RUQ tenderness. Non distended. Skin; warm and dry, no cyanosis  Gu: no cva tenderness  Extremities: atraumatic, no focal motor deficits, no open wounds  Psych: No tremor, visual hallucinations    Pathology: n/a    Radiology: I reviewed relevant abdominal imaging from this admission and that available in the EMR including CT abd/pel from 8/23/20.  My assessment is cholelithiasis, pancreatitis    Assessment:  Benson Carter is a 75 y.o. male with gallstone pancreatitis    Patient Active Problem List   Diagnosis    Erectile dysfunction    Hearing difficulty    Essential hypertension    Chronic systolic (congestive) heart failure (HCC)    Iron deficiency anemia    Gynecomastia, male    Left ventricular hypertrophy    Heroin use    NICM (nonischemic cardiomyopathy) ([facility] [address]    Mitral valve insufficiency    Asymptomatic PVCs    CKD (chronic kidney disease), stage II    Prediabetes    Insomnia    Tobacco abuse    Syncope    Hepatitis C    Calculus of gallbladder without cholecystitis without obstruction    Mild persistent asthma without complication    Endocarditis due to methicillin susceptible Staphylococcus aureus (MSSA)    Chronic obstructive pulmonary disease (HCC)    Pancreatitis, unspecified pancreatitis type    Abdominal pain       Plan:  Check MRCP to rule out choledocholithiasis  In light of cardiac status, patient appears to be high risk for cholecystectomy. Can consider ercp with [facility] if remains to high of a surgical risk. Await further cardiology input  Time spent reviewing past medical, surgical, social and family history, vitals, nursing assessment and images. Time spent face to face with patient and family counciling and discussing care exceeded 50% of the time of the consult. Additional time spent reviewing images and labs, discussing case with nursing, support staff and other physicians; as well as coordinating care.         Physician Signature: Electronically signed by Dr. Bronwyn Darby Statement Selected

## 2022-04-08 NOTE — ASU DISCHARGE PLAN (ADULT/PEDIATRIC) - NS MD DC FALL RISK RISK
For information on Fall & Injury Prevention, visit: [email or web address] OR  [email or web address] OR  https://www.cdc.gov/steadi/patient.html

## 2022-04-08 NOTE — H&P CARDIOLOGY - NSICDXPASTMEDICALHX_GEN_ALL_CORE_FT
PAST MEDICAL HISTORY:  Anxiety     History of marijuana use last Oct 21    History of methamphetamine use Oct 2021    No pertinent past medical history

## 2022-04-08 NOTE — ASU PATIENT PROFILE, ADULT - FALL HARM RISK - UNIVERSAL INTERVENTIONS
Bed in lowest position, wheels locked, appropriate side rails in place/Call bell, personal items and telephone in reach/Instruct patient to call for assistance before getting out of bed or chair/Non-slip footwear when patient is out of bed/[place] to call system/Physically safe environment - no spills, clutter or unnecessary equipment/Purposeful Proactive Rounding/Room/bathroom lighting operational, light cord in reach

## 2022-04-08 NOTE — ASU DISCHARGE PLAN (ADULT/PEDIATRIC) - ASU DC SPECIAL INSTRUCTIONSFT
Please see pre printed discharge instructions sheet .   Follow up with your cardiologist Dr Rojas in 2 weeks in [facility]

## 2022-04-08 NOTE — PROGRESS NOTE ADULT - SUBJECTIVE AND OBJECTIVE BOX
Patient is a 21y old  Male who presents with a chief complaint of palpitations (07 Apr 2022 15:50)      pt seen in tele [x  ], reg med floor [   ], bed [ x ], chair at bedside [   ], a+o x3 [ x ], lethargic [  ],    nad [ x ]      Allergies    No Known Allergies        Vitals    T(F): 98 (04-08-22 @ 05:00), Max: 98.2 (04-07-22 @ 19:34)  HR: 74 (04-08-22 @ 05:00) (70 - 92)  BP: 103/51 (04-08-22 @ 05:00) (103/51 - 151/61)  RR: 18 (04-08-22 @ 05:00) (17 - 19)  SpO2: 98% (04-08-22 @ 05:00) (95% - 100%)  Wt(kg): --  CAPILLARY BLOOD GLUCOSE          Labs                          13.9   6.69  )-----------( 198      ( 07 Apr 2022 06:54 )             40.9       04-07    140  |  107  |  12  ----------------------------<  89  3.6   |  24  |  0.76    Ca    9.1      07 Apr 2022 06:54  Phos  4.6     04-07  Mg     2.0     04-07    TPro  7.8  /  Alb  4.2  /  TBili  0.8  /  DBili  x   /  AST  14  /  ALT  42  /  AlkPhos  59  04-07      CARDIAC MARKERS ( 07 Apr 2022 00:44 )  x     / x     / 73 U/L / x     / <1.0 ng/mL  CARDIAC MARKERS ( 06 Apr 2022 21:55 )  x     / x     / 74 U/L / x     / <1.0 ng/mL      Troponin I, High Sensitivity Result: 3.6 ng/L (04-07-22 @ 00:44)  Troponin I, High Sensitivity Result: 6.6 ng/L (04-06-22 @ 21:55)  Troponin I, High Sensitivity Result: 5.7 ng/L (04-06-22 @ 18:50)        Radiology Results      Meds    MEDICATIONS  (STANDING):      MEDICATIONS  (PRN):  LORazepam   Injectable 0.5 milliGRAM(s) IV Push daily PRN Anxiety      Physical Exam    Neuro :  no focal deficits  Respiratory: CTA B/L  CV: RRR, S1S2, no murmurs,   Abdominal: Soft, NT, ND +BS,  Extremities: No edema, + peripheral pulses    ASSESSMENT    Chest pain   r/o acs,   palpitation r/o wpw,   r/o afib,   panic attack,   h/o methamphetamine use disorder (last use Oct2021)      PLAN    cont tele,   acs protocol, hold lopressor pending drug screen,   cont aspirin, statin,   cardio cons    trop x 3neg noted above   f/u echo  f/u stress test  ep cons. ativan prn  cont current meds       Patient is a 21y old  Male who presents with a chief complaint of palpitations (07 Apr 2022 15:50)      pt seen in tele [x  ], reg med floor [   ], bed [ x ], chair at bedside [   ], a+o x3 [ x ], lethargic [  ],    nad [ x ]      Allergies    No Known Allergies        Vitals    T(F): 98 (04-08-22 @ 05:00), Max: 98.2 (04-07-22 @ 19:34)  HR: 74 (04-08-22 @ 05:00) (70 - 92)  BP: 103/51 (04-08-22 @ 05:00) (103/51 - 151/61)  RR: 18 (04-08-22 @ 05:00) (17 - 19)  SpO2: 98% (04-08-22 @ 05:00) (95% - 100%)  Wt(kg): --  CAPILLARY BLOOD GLUCOSE          Labs                          13.9   6.69  )-----------( 198      ( 07 Apr 2022 06:54 )             40.9       04-07    140  |  107  |  12  ----------------------------<  89  3.6   |  24  |  0.76    Ca    9.1      07 Apr 2022 06:54  Phos  4.6     04-07  Mg     2.0     04-07    TPro  7.8  /  Alb  4.2  /  TBili  0.8  /  DBili  x   /  AST  14  /  ALT  42  /  AlkPhos  59  04-07      CARDIAC MARKERS ( 07 Apr 2022 00:44 )  x     / x     / 73 U/L / x     / <1.0 ng/mL  CARDIAC MARKERS ( 06 Apr 2022 21:55 )  x     / x     / 74 U/L / x     / <1.0 ng/mL      Troponin I, High Sensitivity Result: 3.6 ng/L (04-07-22 @ 00:44)  Troponin I, High Sensitivity Result: 6.6 ng/L (04-06-22 @ 21:55)  Troponin I, High Sensitivity Result: 5.7 ng/L (04-06-22 @ 18:50)      < from: Stress Echo Exercise (w/TTE, w/Cont) (04.07.22 @ 09:24) >  CONCLUSIONS:  1. Normal augmentation in left ventricular function.  2. Normal stress echocardiogram with no evidence of  inducible ischemia.    ------------------------------------------------------------------------  SUMMARY:  Normal stress echocardiogram with no evidence of inducible  ischemia.    < end of copied text >    < from: Transthoracic Echocardiogram (04.07.22 @ 07:44) >  CONCLUSIONS:  1. Normal mitral valve. Trivial mitral regurgitation.  2. Normaltrileaflet aortic valve. No aortic stenosis. No  aortic valve regurgitation seen.  3. Normal left ventricular internal dimensions and wall  thicknesses.  4. Normal Left Ventricular Systolic Function,  (EF = 58% by  biplane).  5. Normal diastolic function.  6. Right ventricle not well visualized. Normal RV systolic  function (TAPSE 1.8 cm).  7. RV systolic pressure is normal at  23 mm Hg.  8. Normal tricuspid valve. Mild tricuspid regurgitation.  9. No pericardial effusion.  10. A dilated coronarysinus is noted (1.8 cm).  Differential for this finding includes persistent left  superior vena cava, total anomalous pulmonary venous  return, and coronary atrioventricular fistula, among  others. Consider echo with agitated saline injection in the  left arm and/or CT angiogram as clinically indicated.    < end of copied text >        Radiology Results      Meds    MEDICATIONS  (STANDING):      MEDICATIONS  (PRN):  LORazepam   Injectable 0.5 milliGRAM(s) IV Push daily PRN Anxiety      Physical Exam    Neuro :  no focal deficits  Respiratory: CTA B/L  CV: RRR, S1S2, no murmurs,   Abdominal: Soft, NT, ND +BS,  Extremities: No edema, + peripheral pulses    ASSESSMENT    Chest pain   acs r/o,   left-sided accessory pathway   panic attack,   h/o methamphetamine use disorder (last use Oct2021)      PLAN    cont tele,   acs protocol, hold lopressor pending drug screen,   cont aspirin, statin,   cardio f/u    trop x 3neg noted above   echo Normal Left Ventricular Systolic Function,  (EF = 58% bybiplane). Normal diastolic function. A dilated coronarysinus is noted (1.8 cm).  Differential for this finding includes persistent left superior vena cava, total anomalous pulmonary venousreturn, and coronary atrioventricular fistula, among  others. Consider echo with agitated saline injection in the left arm and/or CT angiogram as clinically indicated noted above.  stress echo with Normal stress echocardiogram with no evidence of inducible ischemia noted above.  ep cons noted.   pt scheduled for transfer to [place] for ep studies and possible ablation  ativan prn  cont current meds

## 2022-04-08 NOTE — DISCHARGE NOTE NURSING/CASE MANAGEMENT/SOCIAL WORK - PATIENT PORTAL LINK FT
You can access the FollowMyHealth Patient Portal offered by [facility] by registering at the following website: [email or web address]. By joining [facility]’s FollowMyHealth portal, you will also be able to view your health information using other applications (apps) compatible with our system.

## 2022-04-08 NOTE — DISCHARGE NOTE NURSING/CASE MANAGEMENT/SOCIAL WORK - NSDCPEFALRISK_GEN_ALL_CORE
For information on Fall & Injury Prevention, visit: [email or web address] OR  [email or web address] OR  https://www.cdc.gov/steadi/patient.html

## 2022-04-08 NOTE — CHART NOTE - NSCHARTNOTEFT_GEN_A_CORE
EP Brief Operative Note    Diagnosis: Paroxysmal SVT (AT)  Procedure: EP Study including CS catheter for LA pacing and recording.  3D mapping.  Transseptal puncture.  Isoproterenol infusion.  Surgeon: Ernesto Gregory M.D.  Findings:   1) Baseline EKG abnormal, with Q waves I, avL, lateral precordial leads, short IN interval, large R waves in V1,V2.    2) Baseline intracardiac difficult to find His bundle electrogram, but within normal limits ~50ms.  No pre-excitation.  3) EP study notable for NO dual AV node physiology.  No VA conduction under sedation but has VA conduction with isoproterenol.     CS pacing induced short bursts of 1:1 SVT at 250 with atrial activation from left to right.  During this, there was no pre-excitation.       V-pacing under isoproterenol with concentric decremental retrograde conduction.      12mg dose of adenosine induced AFib.     6mg doses of adenosine did not identify pre-excitation, nor eccentric VA conduction.  4) Anatomically, there was concern for an enlarged CS on echo, pre-op.  The coronary sinus catheter was able to pass up to the left clavicle, and a CS venogram confirmed the presence of a persistent left SVC, with no SVC-LA communication identified.  5) A transseptal puncture was performed after seeing LA AT multiple times.  Tachycardia was not able to be sustained or even provide enough APC's for mapping once we were in the left atrium.  The earliest individual electrogram was 28ms pre-P-wave in the posterior LIPV, which was earlier than the LSPV, earlier than the appendage and earlier than the lateral MV annulus.  We were not set up for a PVI (no ET tube, no esophageal temp probe), nor were consented for such.   The procedure was terminated without performing a lilia-PVI.     EBL: minimal  Specimens: none  Post-op Diagnosis:  Assistants: Mele Verdin M.D.    A/P)   Mr Ford is a very pleasant 20yo man with infrequent but severe episodes of palpitations.  The baseline EKG was concerning for a left lateral accessory pathway.    EP study findings:  No dual AV node physiology, no sign of antegrade or retrograde extra-karen conduction, inducible paroxysmal AT that is likely from the left pulmonary veins.    Recommend:  3 hrs bedrest.  DC home tonite or tomorrow, when he is ready.  No ablation was performed.  Metoprolol 25mg PO as frequently as q6 hrs, PRN for palpitations.  Provide Rx on discharge.  Followup with Dr Rojas at the [place] office, 4/28/2022, at 12 noon.  If he returns with sustained AT, recommend left lilia-PVI under general anesthesia, at [place].    Ernesto Gregory M.D.  Cardiac Electrophysiology  office [phone]  pager [phone]

## 2022-04-09 VITALS
TEMPERATURE: 98 F | OXYGEN SATURATION: 99 % | HEART RATE: 95 BPM | SYSTOLIC BLOOD PRESSURE: 118 MMHG | DIASTOLIC BLOOD PRESSURE: 69 MMHG | RESPIRATION RATE: 18 BRPM

## 2022-04-09 DIAGNOSIS — I47.1 SUPRAVENTRICULAR TACHYCARDIA: ICD-10-CM

## 2022-04-09 PROCEDURE — C1732: CPT

## 2022-04-09 PROCEDURE — 86850 RBC ANTIBODY SCREEN: CPT

## 2022-04-09 PROCEDURE — 86901 BLOOD TYPING SEROLOGIC RH(D): CPT

## 2022-04-09 PROCEDURE — 93005 ELECTROCARDIOGRAM TRACING: CPT

## 2022-04-09 PROCEDURE — 86900 BLOOD TYPING SEROLOGIC ABO: CPT

## 2022-04-09 PROCEDURE — C1730: CPT

## 2022-04-09 PROCEDURE — 93462 L HRT CATH TRNSPTL PUNCTURE: CPT

## 2022-04-09 PROCEDURE — C1731: CPT

## 2022-04-09 PROCEDURE — C1769: CPT

## 2022-04-09 PROCEDURE — 93620 COMP EP EVL R AT VEN PAC&REC: CPT

## 2022-04-09 PROCEDURE — C1894: CPT

## 2022-04-09 PROCEDURE — 93621 COMP EP EVL L PAC&REC C SINS: CPT

## 2022-04-09 PROCEDURE — 93623 PRGRMD STIMJ&PACG IV RX NFS: CPT

## 2022-04-09 RX ORDER — METOPROLOL TARTRATE 50 MG
1 TABLET ORAL
Qty: 120 | Refills: 0
Start: 2022-04-09 | End: 2022-05-08

## 2022-04-09 RX ORDER — ACETAMINOPHEN 500 MG
650 TABLET ORAL ONCE
Refills: 0 | Status: COMPLETED | OUTPATIENT
Start: 2022-04-09 | End: 2022-04-09

## 2022-04-09 RX ADMIN — Medication 650 MILLIGRAM(S): at 04:39

## 2022-04-09 RX ADMIN — Medication 650 MILLIGRAM(S): at 09:41

## 2022-04-09 RX ADMIN — Medication 650 MILLIGRAM(S): at 04:05

## 2022-04-09 RX ADMIN — Medication 650 MILLIGRAM(S): at 00:00

## 2022-04-09 RX ADMIN — Medication 650 MILLIGRAM(S): at 10:38

## 2022-04-09 NOTE — DISCHARGE NOTE PROVIDER - PROVIDER TOKENS
PROVIDER:[TOKEN:[[id number]],SCHEDULEDAPPT:[04/28/2022],SCHEDULEDAPPTTIME:[12:00 PM],ESTABLISHEDPATIENT:[T]]

## 2022-04-09 NOTE — DISCHARGE NOTE NURSING/CASE MANAGEMENT/SOCIAL WORK - NSDCFUADDAPPT_GEN_ALL_CORE_FT
- Patient to follow up in our [place] office with Dr. Rojas on Thursday 4/28 at 12 PM ([address], [place] [address], Office #[phone])

## 2022-04-09 NOTE — DISCHARGE NOTE PROVIDER - NSDCCPCAREPLAN_GEN_ALL_CORE_FT
PRINCIPAL DISCHARGE DIAGNOSIS  Diagnosis: Paroxysmal atrial tachycardia  Assessment and Plan of Treatment: There was concerns for the need for ablation, however a tachycardia could not be reproduced and so no ablation was done. You should take metoprolol 25mg by mouth as frequent as every six hours as necessary if you have palpitations. This medication was sent to [facility] in [place].   You have a followup with Dr Rojas at the [place] office, 4/28/2022, at 12 noon.  If you experience palpitations again please call your cardiologist. Dr. Rojas on Thursday 4/28 at 12 PM ([phone] [place] [address], [place] [address], Office #[phone])  Supraventricular Tachycardia  Supraventricular tachycardia (SVT) is a type of abnormal heart rhythm that causes your heart to beat very quickly. A normal heart rate is 60?100 beats per minute. During an episode of SVT, your heart rate may be 150?250 beats per minute. This can make you feel light-headed and short of breath. Although SVT is usually harmless, when SVT happens often or it lasts for long periods, it can lead to heart weakness and failure. SVT can be triggered by stress, smoking, alcohol, caffeine, or stimulant drugs. Treatment may involve certain maneuvers, medicines, or electric shock (cardioversion).   SEEK IMMEDIATE MEDICAL CARE IF YOU HAVE ANY OF THE FOLLOWING SYMPTOMS: chest pain, shortness of breath, dizziness/lightheadedness, or fainting.

## 2022-04-09 NOTE — DISCHARGE NOTE NURSING/CASE MANAGEMENT/SOCIAL WORK - NSDCPEFALRISK_GEN_ALL_CORE
For information on Fall & Injury Prevention, visit: [email or web address] OR  [email or web address] OR  https://www.cdc.gov/steadi/patient.html

## 2022-04-09 NOTE — DISCHARGE NOTE PROVIDER - NSDCFUADDAPPT_GEN_ALL_CORE_FT
- Patient to follow up in our [place] office with Dr. Rojas on Thursday 4/28 at 12 PM ([address], [place] [address], Office #[phone])

## 2022-04-09 NOTE — DISCHARGE NOTE NURSING/CASE MANAGEMENT/SOCIAL WORK - PATIENT PORTAL LINK FT
You can access the FollowMyHealth Patient Portal offered by [facility] by registering at the following website: [email or web address]. By joining [facility]’s FollowMyHealth portal, you will also be able to view your health information using other applications (apps) compatible with our system.

## 2022-04-09 NOTE — DISCHARGE NOTE PROVIDER - NSDCMRMEDTOKEN_GEN_ALL_CORE_FT
metoprolol tartrate 25 mg oral tablet: 1 tab(s) orally every 6 hours, As neede for  palpitations  SEROquel 25 mg oral tablet: 1 tab(s) orally once a day (at bedtime) HOME

## 2022-04-09 NOTE — DISCHARGE NOTE PROVIDER - NSDCFUADDINST_GEN_ALL_CORE_FT
WOUND CARE:  The day AFTER your procedure  - Remove the bandage at the site GENTLY, clean with mild soap and water, and pat dry; leave open to air  - You may shower   - DO NOT apply lotions, creams, ointments, powder, perfumes to your incision site  -Check your groin every day. A small amount of bruising or soreness is normal, a bump ( smaller than nickel) might be present, normal  - DO NOT SOAK your site for 1 week ( no baths, no pools, no tubs, etc..)    ACTIVITY:  Your procedure was done through your groin  for the next 5 DAYS:  - Limit climbing stairs, no strenous activity, pushing , pulling, or straining   DO NOT LIFT anything 10 lbs or heavier   you may resume sexual activity in 7 days, unless instructed otherwise  mild palpitations are normal     Follow heart healthy diet reccomended by your doctor, if you smoke STOP SMOKING ( may call [phone] for center of tobacco control if you need assistance).  for the next 24 hours:   - stay at home and rest, do not drive or operate heavy machinery  do not drink alcoholic beverages   do not make important personal or business decisions     ***CALL YOUR DOCTOR ***  IF you have fever, chills, body aches, or severe pain, swelling, redness, heat, yellow drainage from your incision site  IF bleeding  or significant new swelling from your puncture site  IF you experience rapid heartbeat or palpitations that cause: lightheadedness, dizziness, or fainting spell.  If you experience difficulty swallowing, or pain with swallowing   IF unable to get in contact with your doctor, you may call the Cardiology Office at [facility] at [phone]

## 2022-04-09 NOTE — DISCHARGE NOTE PROVIDER - CARE PROVIDER_API CALL
Keturah Mendoza)  Cardiovascular Disease; Internal Medicine  [address], [address]  [place] [address]  Phone: [phone]  Fax: [phone]  Established Patient  Scheduled Appointment: 04/28/2022 12:00 PM

## 2022-04-09 NOTE — PROGRESS NOTE ADULT - ASSESSMENT
Patient is a 21yoM, h/o methamphetamine use disorder (last use Oct2021), presents with palpitation. He reports sudden chest palpitation around at 4:30pm, [place]. He reports associated burning, left-sided chest pain that is 8-9/10, intermittent, and non-radiating. It was also associated with nausea SOB and HA. He states feeling of panic, crowdedness, anxiousness, heat flush, and perspiration. He abruptly stopped taking seroquel 2 wks ago due to unable to follow up with psychiatrist. He denies fever, chills, vomiting, abdominal pain, urinary or bowel movement changes. (06 Apr 2022 21:45). Here for possible ablation

## 2022-04-09 NOTE — DISCHARGE NOTE PROVIDER - NSRESEARCHGRANT_OVERRIDEREC_GEN_A_CORE
"My nose is bleeding too many times since Thursday, today 3 times" IMPROVE-DD Application Not Available

## 2022-04-09 NOTE — PROGRESS NOTE ADULT - PROBLEM SELECTOR PLAN 1
s/p EP study  No ablation was performed.  Metoprolol 25mg PO as frequently as q6 hrs, PRN for palpitations.     Followup with Dr Rojas at the [place] office, 4/28/2022, at 12 noon.  If he returns with sustained AT, recommend left lilia-PVI under general anesthesia, at [facility].  Will follow

## 2022-12-02 NOTE — ASU PATIENT PROFILE, ADULT - INTERNATIONAL TRAVEL
Quality 110: Preventive Care And Screening: Influenza Immunization: Influenza Immunization Administered during Influenza season Additional Notes: Pt received COVID vaccines Quality 130: Documentation Of Current Medications In The Medical Record: Current Medications Documented Detail Level: Detailed No

## 2023-12-29 NOTE — ED ADULT NURSE NOTE - CHPI ED NUR SYMPTOMS POS
Patient seen in same day virtual. See note. Labs set up and will have staff set up follow up appointment with you. 
Patient was seen virtually. Please schedule labs for [facility], and schedule follow up in 2 weeks with PCP or provider in PCP group
CHEST PAIN/PALPITATIONS

## 2024-01-24 NOTE — H&P ADULT - BACK EXAM
EMERGENCY DEPARTMENT NOTE  History and Physical     Patient:   Zhane Sosa   MRN:    [id number]    YOB: 2010      SUBJECTIVE:  Chief Complaint   Patient presents with    Psychiatric Evaluation    Vomiting     13 year old female with past medical history of anxiety who presents with chief complaint nausea, vomiting, ideations and anxiety.  Upon initial interview, patient is very upset, crying and screaming.  She is able to tell me that she has had 2 weeks of abdominal pain and vomiting.  She feels like she cannot keep food down and when she does not feel nauseous she does not feel like eating.  She says she occasionally has acid reflux in between vomiting episodes.  She denies any blood in the vomit.  She says her abdomen is mostly painful in the epigastric area.  She is also had occasional loose stools but no blood in the stools.  She denies any upper respiratory symptoms, cough, difficulty breathing, rashes.    She endorses significant anxiety over the last few weeks.  Her brother left for school and her parents are .  She endorses suicidal ideations.    Patient is uptodate on vaccinations. No one else in the house is sick.     Mom is present and said she had a similar episode of nausea and vomiting in July of last year but not this significant.    Review of Systems  ROS negative unless noted above in the HPI.    History reviewed. No pertinent past medical history.  History reviewed. No pertinent surgical history.  No family history on file.     (Not in a hospital admission)      OBJECTIVE:  Physical Exam  Vitals:    01/24/24 1133 01/24/24 1249 01/24/24 1313   BP: 107/66  (!) 91/52   BP Location: RUE - Right upper extremity     Patient Position: Sitting/High-Marshall's     Pulse: 95  73   Resp: 20  20   Temp: 97.5 °F (36.4 °C)  97.3 °F (36.3 °C)   TempSrc: Temporal     SpO2: 99%  97%   Weight: 47 kg (103 lb 9.9 oz)     Height:  5' 2\" (1.575 m)    LMP: 12/20/2023       Physical Exam  Vitals  and nursing note reviewed.   Constitutional:       Appearance: Normal appearance.   HENT:      Head: Normocephalic and atraumatic.      Right Ear: Tympanic membrane and external ear normal.      Left Ear: Tympanic membrane and external ear normal.      Nose: Nose normal.      Mouth/Throat:      Mouth: Mucous membranes are moist.   Eyes:      Extraocular Movements: Extraocular movements intact.   Cardiovascular:      Rate and Rhythm: Normal rate.      Heart sounds: Normal heart sounds.   Pulmonary:      Effort: Pulmonary effort is normal.      Breath sounds: Normal breath sounds.   Abdominal:      General: Abdomen is flat.      Palpations: Abdomen is soft.      Tenderness: There is abdominal tenderness in the epigastric area.   Musculoskeletal:         General: Normal range of motion.      Cervical back: Normal range of motion.   Skin:     General: Skin is warm.   Neurological:      Mental Status: She is alert.   Psychiatric:         Mood and Affect: Mood is anxious. Affect is labile and tearful.         Thought Content: Thought content includes suicidal ideation. Thought content includes suicidal plan.         Pulse Ox: Saturation wnl on RA, Indicates adequate oxygenation      Lab/Data Reviewed:  Recent Results (from the past 24 hour(s))   Electrocardiogram 12-Lead    Collection Time: 01/24/24 11:46 AM   Result Value Ref Range    Ventricular Rate EKG/Min (BPM) 75     Atrial Rate (BPM) 74     OK-Interval (MSEC) 118     QRS-Interval (MSEC) 90     QT-Interval (MSEC) 380     QTc 425     P Axis (Degrees) 75     R Axis (Degrees) 92     T Axis (Degrees) -92     REPORT TEXT       -------------------- Pediatric ECG interpretation --------------------      **Poor data quality, interpretation may be adversely affected**  Sinus rhythm  Nonspecific ST-T wave changes, diffusely  , may be an artifact  recommend repeat ECG if clinical concerns persist  Confirmed by WES HOFF, BARRY ([id number]) on 1/24/2024 1:27:29 PM     GLUCOSE,  BEDSIDE - POINT OF CARE    Collection Time: 01/24/24 12:02 PM   Result Value Ref Range    GLUCOSE, BEDSIDE - POINT OF CARE 117 (H) 70 - 99 mg/dL   Comprehensive Metabolic Panel    Collection Time: 01/24/24 12:23 PM   Result Value Ref Range    Fasting Status      Sodium 138 135 - 145 mmol/L    Potassium 2.7 (LL) 3.4 - 5.1 mmol/L    Chloride 107 97 - 110 mmol/L    Carbon Dioxide 20 (L) 21 - 32 mmol/L    Anion Gap 14 7 - 19 mmol/L    Glucose 106 (H) 70 - 99 mg/dL    BUN 15 6 - 20 mg/dL    Creatinine 0.67 0.39 - 0.90 mg/dL    Glomerular Filtration Rate      BUN/Cr 22 7 - 25    Calcium 9.8 8.0 - 11.0 mg/dL    Bilirubin, Total 1.1 (H) 0.2 - 1.0 mg/dL    GOT/AST 17 10 - 45 Units/L    GPT/ALT 14 6 - 35 Units/L    Alkaline Phosphatase 61 60 - 195 Units/L    Albumin 4.3 3.6 - 5.1 g/dL    Protein, Total 7.9 6.0 - 8.3 g/dL    Globulin 3.6 2.0 - 4.0 g/dL    A/G Ratio 1.2 1.0 - 2.4   C Reactive Protein    Collection Time: 01/24/24 12:23 PM   Result Value Ref Range    C-Reactive Protein <0.3 <=1.0 mg/dL   Lipase    Collection Time: 01/24/24 12:23 PM   Result Value Ref Range    Lipase 22 15 - 77 Units/L   Magnesium    Collection Time: 01/24/24 12:23 PM   Result Value Ref Range    Magnesium 1.8 1.7 - 2.4 mg/dL   Phosphorus    Collection Time: 01/24/24 12:23 PM   Result Value Ref Range    Phosphorus 1.7 (L) 2.9 - 5.4 mg/dL   CBC with Automated Differential (performable only)    Collection Time: 01/24/24 12:23 PM   Result Value Ref Range    WBC 6.8 4.2 - 11.0 K/mcL    RBC 4.14 3.90 - 5.30 mil/mcL    HGB 11.8 (L) 12.0 - 15.5 g/dL    HCT 34.7 (L) 36.0 - 46.5 %    MCV 83.8 78.0 - 100.0 fl    MCH 28.5 26.0 - 34.0 pg    MCHC 34.0 32.0 - 36.5 g/dL    RDW-CV 12.3 11.0 - 15.0 %    RDW-SD 37.6 35.0 - 47.0 fL     140 - 450 K/mcL    NRBC 0 <=0 /100 WBC    Neutrophil, Percent 74 %    Lymphocytes, Percent 18 %    Mono, Percent 7 %    Eosinophils, Percent 0 %    Basophils, Percent 1 %    Immature Granulocytes 0 %    Absolute Neutrophils 5.1  1.8 - 8.0 K/mcL    Absolute Lymphocytes 1.2 (L) 1.5 - 6.5 K/mcL    Absolute Monocytes 0.5 0.0 - 0.8 K/mcL    Absolute Eosinophils  0.0 0.0 - 0.5 K/mcL    Absolute Basophils 0.0 0.0 - 0.2 K/mcL    Absolute Immature Granulocytes 0.0 0.0 - 0.2 K/mcL   Salicylate Level    Collection Time: 01/24/24 12:23 PM   Result Value Ref Range    Salicylate <2.8 <=30.0 mg/dL   Acetaminophen Level    Collection Time: 01/24/24 12:23 PM   Result Value Ref Range    Acetaminophen <2 (L) 10 - 30 mcg/mL     No orders to display         MEDICAL DECISION MAKING  This is a 13 year old female ending with 2 weeks of abdominal pain and vomiting as well as concerns for suicidal ideations.  Upon initial examination, patient is in acute distress out of anxiety.  She says that she \"keeps panicking\".  She is hemodynamically stable and vitals are reassuring.  Patient requesting anxiolytic which I think is reasonable.  Workup ordered for both psychiatric concerns as well as vomiting concerns.    Labs were ordered and reviewed by me, labs concerning for malnourishment. K of 2.7, phos of 1.7 salicylate negative, no evidence of infection without any leukocytosis or elevated CRP.  Negative.    KG with a rate of 75, normal sinus rhythm without ST elevation indication of ischemia no evidence of Brugada or WPW.  Some artifact present.  Overall unremarkable EKG interpreted by me    At this time, clinical picture not consistent with a surgical emergency, sepsis, pancreatitis, or salicylate overdose.  She does appear to be dehydrated and concern for malnutrition secondary to vomiting.  Patient will require medical clearance for psychiatric placement for suicidal ideations.  Patient requires sitter.    Discussed with general pediatric medicine who agreed with admission.    ED Course as of 01/24/24 1344   Wed Jan 24, 2024   1331 Patient had self-limited episode of desaturation while sleeping after IN versed. Initiall placed on NC O2 but weaned off. Significant  labs include K 2.7 and phos 1.7 which may be due to nutritional deficiency. Otherwise labs reassuring. Given dehydration and electrolyte abnormalities, will admit to medical inpatient unit. Psych eval to be held pending medical clearance. [MC]      ED Course User Index  [MC] Fernando Mcgill MD     ED Medication Orders (From admission, onward)      Ordered Start     Status Ordering Provider    01/24/24 1140 01/24/24 1200  sodium chloride (NORMAL SALINE) 0.9 % bolus 940 mL  ONCE         Last MAR action: New Bag FERNANDO MCGILL    01/24/24 1142 01/24/24 1145  MIDazolam (VERSED) intranasal kit 10 mg  ONCE         Last MAR action: Given FERNANDO MCGILL          Procedures    IMPRESSION AND PLAN  ED Diagnosis   1. Suicidal ideation        2. Anxiety        3. Dehydration            DISPOSITION  Admit 1/24/2024  1:44 PM  Telemetry Bed?: No  Admitting Physician: SHIELA NUÑEZ [[id number]]  Is this a telephone or verbal order?: This is a telephone order from the admitting physician  Transferring Patient to? Only adjust for transfers between [facility]'s and [facility] ([facility]): [facility] [[id number]]    I participated in the care of this patient and this note provides additional information regarding their visit. Please refer to attending note for further details regarding history, physical exam, workup, medical decision making, and disposition. This note may have been created using voice dictation technology and may include inadvertent errors.       New Prescriptions    No medications on file     Follow up:  No follow-up provider specified.    Jessica Soto DO  Emergency Medicine PGY 2     Jessica Soto DO  Resident  01/24/24 7767     normal/normal shape/ROM intact/strength intact

## 2024-03-02 NOTE — ED PROVIDER NOTE - ENMT, MLM
Patient taken to bathroom. Walked down the brown with x2 assist, no complaints of dizziness, chest pain or SOB   Airway patent, Nasal mucosa clear. Mouth with normal mucosa. Throat has no vesicles, no oropharyngeal exudates and uvula is midline.

## 2024-04-15 NOTE — ASU PATIENT PROFILE, ADULT - NS SC CAGE ALCOHOL GUILTY ABOUT
I have personally verified, reviewed, and approved of these actions and the plan for follow-up care as documented.    no

## 2025-01-25 ENCOUNTER — EMERGENCY (EMERGENCY)
Facility: HOSPITAL | Age: 25
LOS: 1 days | End: 2025-01-25
Attending: STUDENT IN AN ORGANIZED HEALTH CARE EDUCATION/TRAINING PROGRAM
Payer: MEDICAID

## 2025-01-25 VITALS
DIASTOLIC BLOOD PRESSURE: 76 MMHG | SYSTOLIC BLOOD PRESSURE: 113 MMHG | TEMPERATURE: 98 F | OXYGEN SATURATION: 97 % | RESPIRATION RATE: 16 BRPM | WEIGHT: 210.1 LBS | HEART RATE: 77 BPM

## 2025-01-25 VITALS
OXYGEN SATURATION: 99 % | RESPIRATION RATE: 18 BRPM | TEMPERATURE: 98 F | SYSTOLIC BLOOD PRESSURE: 107 MMHG | HEART RATE: 70 BPM | DIASTOLIC BLOOD PRESSURE: 70 MMHG

## 2025-01-25 PROBLEM — F41.9 ANXIETY DISORDER, UNSPECIFIED: Chronic | Status: ACTIVE | Noted: 2022-04-08

## 2025-01-25 PROBLEM — Z87.898 PERSONAL HISTORY OF OTHER SPECIFIED CONDITIONS: Chronic | Status: ACTIVE | Noted: 2022-04-08

## 2025-01-25 LAB
ALBUMIN SERPL ELPH-MCNC: 4.1 G/DL — SIGNIFICANT CHANGE UP (ref 3.5–5)
ALP SERPL-CCNC: 53 U/L — SIGNIFICANT CHANGE UP (ref 40–120)
ALT FLD-CCNC: 37 U/L DA — SIGNIFICANT CHANGE UP (ref 10–60)
ANION GAP SERPL CALC-SCNC: 2 MMOL/L — LOW (ref 5–17)
APTT BLD: 38.1 SEC — HIGH (ref 24.5–35.6)
AST SERPL-CCNC: 17 U/L — SIGNIFICANT CHANGE UP (ref 10–40)
BASOPHILS # BLD AUTO: 0.05 K/UL — SIGNIFICANT CHANGE UP (ref 0–0.2)
BASOPHILS NFR BLD AUTO: 1 % — SIGNIFICANT CHANGE UP (ref 0–2)
BILIRUB SERPL-MCNC: 1.3 MG/DL — HIGH (ref 0.2–1.2)
BUN SERPL-MCNC: 9 MG/DL — SIGNIFICANT CHANGE UP (ref 7–18)
CALCIUM SERPL-MCNC: 9.1 MG/DL — SIGNIFICANT CHANGE UP (ref 8.4–10.5)
CHLORIDE SERPL-SCNC: 108 MMOL/L — SIGNIFICANT CHANGE UP (ref 96–108)
CO2 SERPL-SCNC: 27 MMOL/L — SIGNIFICANT CHANGE UP (ref 22–31)
CREAT SERPL-MCNC: 0.72 MG/DL — SIGNIFICANT CHANGE UP (ref 0.5–1.3)
EGFR: 129 ML/MIN/1.73M2 — SIGNIFICANT CHANGE UP
EOSINOPHIL # BLD AUTO: 0.03 K/UL — SIGNIFICANT CHANGE UP (ref 0–0.5)
EOSINOPHIL NFR BLD AUTO: 0.6 % — SIGNIFICANT CHANGE UP (ref 0–6)
GLUCOSE SERPL-MCNC: 98 MG/DL — SIGNIFICANT CHANGE UP (ref 70–99)
HCT VFR BLD CALC: 41.4 % — SIGNIFICANT CHANGE UP (ref 39–50)
HGB BLD-MCNC: 14 G/DL — SIGNIFICANT CHANGE UP (ref 13–17)
IMM GRANULOCYTES NFR BLD AUTO: 0 % — SIGNIFICANT CHANGE UP (ref 0–0.9)
INR BLD: 1.07 RATIO — SIGNIFICANT CHANGE UP (ref 0.85–1.16)
LYMPHOCYTES # BLD AUTO: 1.43 K/UL — SIGNIFICANT CHANGE UP (ref 1–3.3)
LYMPHOCYTES # BLD AUTO: 29.4 % — SIGNIFICANT CHANGE UP (ref 13–44)
MAGNESIUM SERPL-MCNC: 2.2 MG/DL — SIGNIFICANT CHANGE UP (ref 1.6–2.6)
MCHC RBC-ENTMCNC: 29.6 PG — SIGNIFICANT CHANGE UP (ref 27–34)
MCHC RBC-ENTMCNC: 33.8 G/DL — SIGNIFICANT CHANGE UP (ref 32–36)
MCV RBC AUTO: 87.5 FL — SIGNIFICANT CHANGE UP (ref 80–100)
MONOCYTES # BLD AUTO: 0.35 K/UL — SIGNIFICANT CHANGE UP (ref 0–0.9)
MONOCYTES NFR BLD AUTO: 7.2 % — SIGNIFICANT CHANGE UP (ref 2–14)
NEUTROPHILS # BLD AUTO: 3.01 K/UL — SIGNIFICANT CHANGE UP (ref 1.8–7.4)
NEUTROPHILS NFR BLD AUTO: 61.8 % — SIGNIFICANT CHANGE UP (ref 43–77)
NRBC # BLD: 0 /100 WBCS — SIGNIFICANT CHANGE UP (ref 0–0)
PHOSPHATE SERPL-MCNC: 3.3 MG/DL — SIGNIFICANT CHANGE UP (ref 2.5–4.5)
PLATELET # BLD AUTO: 189 K/UL — SIGNIFICANT CHANGE UP (ref 150–400)
POTASSIUM SERPL-MCNC: 3.7 MMOL/L — SIGNIFICANT CHANGE UP (ref 3.5–5.3)
POTASSIUM SERPL-SCNC: 3.7 MMOL/L — SIGNIFICANT CHANGE UP (ref 3.5–5.3)
PROT SERPL-MCNC: 8 G/DL — SIGNIFICANT CHANGE UP (ref 6–8.3)
PROTHROM AB SERPL-ACNC: 12.4 SEC — SIGNIFICANT CHANGE UP (ref 9.9–13.4)
RBC # BLD: 4.73 M/UL — SIGNIFICANT CHANGE UP (ref 4.2–5.8)
RBC # FLD: 12.8 % — SIGNIFICANT CHANGE UP (ref 10.3–14.5)
SODIUM SERPL-SCNC: 137 MMOL/L — SIGNIFICANT CHANGE UP (ref 135–145)
TROPONIN I, HIGH SENSITIVITY RESULT: 3.1 NG/L — SIGNIFICANT CHANGE UP
WBC # BLD: 4.87 K/UL — SIGNIFICANT CHANGE UP (ref 3.8–10.5)
WBC # FLD AUTO: 4.87 K/UL — SIGNIFICANT CHANGE UP (ref 3.8–10.5)

## 2025-01-25 PROCEDURE — 83735 ASSAY OF MAGNESIUM: CPT

## 2025-01-25 PROCEDURE — 71046 X-RAY EXAM CHEST 2 VIEWS: CPT | Mod: 26

## 2025-01-25 PROCEDURE — 71046 X-RAY EXAM CHEST 2 VIEWS: CPT

## 2025-01-25 PROCEDURE — 84100 ASSAY OF PHOSPHORUS: CPT

## 2025-01-25 PROCEDURE — 80053 COMPREHEN METABOLIC PANEL: CPT

## 2025-01-25 PROCEDURE — 93005 ELECTROCARDIOGRAM TRACING: CPT

## 2025-01-25 PROCEDURE — 85730 THROMBOPLASTIN TIME PARTIAL: CPT

## 2025-01-25 PROCEDURE — 99285 EMERGENCY DEPT VISIT HI MDM: CPT

## 2025-01-25 PROCEDURE — 85025 COMPLETE CBC W/AUTO DIFF WBC: CPT

## 2025-01-25 PROCEDURE — 85610 PROTHROMBIN TIME: CPT

## 2025-01-25 PROCEDURE — 99285 EMERGENCY DEPT VISIT HI MDM: CPT | Mod: 25

## 2025-01-25 PROCEDURE — 36415 COLL VENOUS BLD VENIPUNCTURE: CPT

## 2025-01-25 PROCEDURE — 96374 THER/PROPH/DIAG INJ IV PUSH: CPT | Mod: XU

## 2025-01-25 PROCEDURE — 84484 ASSAY OF TROPONIN QUANT: CPT

## 2025-01-25 RX ORDER — ACETAMINOPHEN 80 MG/.8ML
1000 SOLUTION/ DROPS ORAL ONCE
Refills: 0 | Status: COMPLETED | OUTPATIENT
Start: 2025-01-25 | End: 2025-01-25

## 2025-01-25 RX ORDER — SODIUM CHLORIDE 9 MG/ML
1000 INJECTION, SOLUTION INTRAVENOUS ONCE
Refills: 0 | Status: COMPLETED | OUTPATIENT
Start: 2025-01-25 | End: 2025-01-25

## 2025-01-25 RX ADMIN — ACETAMINOPHEN 400 MILLIGRAM(S): 80 SOLUTION/ DROPS ORAL at 16:55

## 2025-01-25 RX ADMIN — SODIUM CHLORIDE 1000 MILLILITER(S): 9 INJECTION, SOLUTION INTRAVENOUS at 16:55

## 2025-01-25 NOTE — ED PROVIDER NOTE - NSFOLLOWUPINSTRUCTIONS_ED_ALL_ED_FT
Palpitations    A palpitation is the feeling that your heartbeat is irregular or is faster than normal. It may feel like your heart is fluttering or skipping a beat. They may be caused by many things, including smoking, caffeine, alcohol, stress, and certain medicines. Although most causes of palpitations are not serious, palpitations can be a sign of a serious medical problem. Avoid caffeine, alcohol, and tobacco products at home. Try to reduce stress and anxiety and make sure to get plenty of rest.     SEEK IMMEDIATE MEDICAL CARE IF YOU HAVE ANY OF THE FOLLOWING SYMPTOMS: severe chest pain, shortness of breath, severe headache, dizziness/lightheadedness, or fainting.

## 2025-01-25 NOTE — ED PROVIDER NOTE - OBJECTIVE STATEMENT
24-year-old male with past medical history SVT presents with chest discomfort since yesterday.  Patient states he used "poppers" yesterday, states approximately 8 hours later started presenting palpitations described as heart racing associated with left-sided chest discomfort.  Patient states symptoms are different than prior episodes SVT.  Patient reports mild headache, but denies any fevers, cough, congestion, shortness of breath, dizziness, lightheadedness, abdominal pain, vomiting, diarrhea, dysuria, numbness, focal weakness, leg swelling, or rash.  Denies any recent injury or trauma.  Denies any family history of sudden cardiac death.  Denies any additional complaints.

## 2025-01-25 NOTE — ED ADULT TRIAGE NOTE - CHIEF COMPLAINT QUOTE
C/o I have a weird sensation in my left chest. I was sniffing poppers (amylonitrate) y/d to get a rush

## 2025-01-25 NOTE — ED PROVIDER NOTE - PATIENT PORTAL LINK FT
You can access the FollowMyHealth Patient Portal offered by [facility] by registering at the following website: [email or web address]. By joining [facility] [facility]’s FollowMyHealth portal, you will also be able to view your health information using other applications (apps) compatible with our system.

## 2025-01-25 NOTE — ED PROVIDER NOTE - CLINICAL SUMMARY MEDICAL DECISION MAKING FREE TEXT BOX
[place]arro: 24-year-old male with past medical history SVT presents with chest discomfort since yesterday.  Patient states he used "poppers" yesterday, states approximately 8 hours later started presenting palpitations described as heart racing associated with left-sided chest discomfort.  Patient states symptoms are different than prior episodes SVT.  Patient reports mild headache, but denies any fevers, cough, congestion, shortness of breath, dizziness, lightheadedness, abdominal pain, vomiting, diarrhea, dysuria, numbness, focal weakness, leg swelling, or rash.  Denies any recent injury or trauma.  Denies any family history of sudden cardiac death.  Physical exam per above. No evidence of a cardiac arrythmia such as Brugada, WPW, HOCM, long or short QT.  Neurologic exam is nonfocal, not c/w CVA or primary neurologic abnormality. EKG RBBB unchanged from prior. No evidence of ACS, pericarditis, myocarditis, pulmonary embolism (PERC neg),  pneumonia, Zoster, or esophageal perforation.  Historically not abrupt in onset, tearing or ripping, pulses symmetric, no e/o aortic dissection. Will obtain labs, imaging r/o PTX, tele monitoring, supportive treatment with dispo pending workup.

## 2025-01-25 NOTE — H&P CARDIOLOGY - EKG AND INTERPRETATION
NSR @ 76 bpm 165.1 NSR @ 76 bpm  large R-waves in V1,V2, short [place], pre-excitation noted in leads III and V1

## 2025-01-25 NOTE — ED PROVIDER NOTE - PROGRESS NOTE DETAILS
Lucks-DO: Labs/imaging with nonactionable findings.  No events on telemetry monitoring.  Symptoms improved.  Will DC with outpatient follow-up/return precautions, patient understood and agreeable with plan.

## 2025-01-25 NOTE — ED PROVIDER NOTE - NSFOLLOWUPCLINICS_GEN_ALL_ED_FT
[facility]  Cardiology  [address], [address]  [place] [address]  Phone: [phone]  Fax:
